# Patient Record
Sex: FEMALE | Race: WHITE
[De-identification: names, ages, dates, MRNs, and addresses within clinical notes are randomized per-mention and may not be internally consistent; named-entity substitution may affect disease eponyms.]

---

## 2019-01-27 ENCOUNTER — HOSPITAL ENCOUNTER (INPATIENT)
Dept: HOSPITAL 62 - ER | Age: 54
LOS: 10 days | Discharge: HOME | DRG: 189 | End: 2019-02-06
Attending: FAMILY MEDICINE | Admitting: FAMILY MEDICINE
Payer: MEDICARE

## 2019-01-27 DIAGNOSIS — G47.33: ICD-10-CM

## 2019-01-27 DIAGNOSIS — J44.0: ICD-10-CM

## 2019-01-27 DIAGNOSIS — F32.9: ICD-10-CM

## 2019-01-27 DIAGNOSIS — F17.200: ICD-10-CM

## 2019-01-27 DIAGNOSIS — F41.1: ICD-10-CM

## 2019-01-27 DIAGNOSIS — E66.01: ICD-10-CM

## 2019-01-27 DIAGNOSIS — J96.02: Primary | ICD-10-CM

## 2019-01-27 DIAGNOSIS — J44.1: ICD-10-CM

## 2019-01-27 DIAGNOSIS — J18.9: ICD-10-CM

## 2019-01-27 LAB
A TYPE INFLUENZA AG: NEGATIVE
ADD MANUAL DIFF: NO
ALBUMIN SERPL-MCNC: 4.3 G/DL (ref 3.5–5)
ALP SERPL-CCNC: 84 U/L (ref 38–126)
ALT SERPL-CCNC: 40 U/L (ref 9–52)
ANION GAP SERPL CALC-SCNC: 6 MMOL/L (ref 5–19)
AST SERPL-CCNC: 21 U/L (ref 14–36)
B INFLUENZA AG: NEGATIVE
BASE EXCESS BLDV CALC-SCNC: 4 MMOL/L
BASOPHILS # BLD AUTO: 0.1 10^3/UL (ref 0–0.2)
BASOPHILS NFR BLD AUTO: 0.6 % (ref 0–2)
BILIRUB DIRECT SERPL-MCNC: 0.2 MG/DL (ref 0–0.4)
BILIRUB SERPL-MCNC: 0.5 MG/DL (ref 0.2–1.3)
BUN SERPL-MCNC: 15 MG/DL (ref 7–20)
CALCIUM: 8.5 MG/DL (ref 8.4–10.2)
CHLORIDE SERPL-SCNC: 99 MMOL/L (ref 98–107)
CO2 SERPL-SCNC: 33 MMOL/L (ref 22–30)
EOSINOPHIL # BLD AUTO: 0 10^3/UL (ref 0–0.6)
EOSINOPHIL NFR BLD AUTO: 0 % (ref 0–6)
ERYTHROCYTE [DISTWIDTH] IN BLOOD BY AUTOMATED COUNT: 14.6 % (ref 11.5–14)
GLUCOSE SERPL-MCNC: 145 MG/DL (ref 75–110)
HCO3 BLDV-SCNC: 32.4 MMOL/L (ref 20–32)
HCT VFR BLD CALC: 43.8 % (ref 36–47)
HGB BLD-MCNC: 14.7 G/DL (ref 12–15.5)
LYMPHOCYTES # BLD AUTO: 3.9 10^3/UL (ref 0.5–4.7)
LYMPHOCYTES NFR BLD AUTO: 34.6 % (ref 13–45)
MCH RBC QN AUTO: 32.1 PG (ref 27–33.4)
MCHC RBC AUTO-ENTMCNC: 33.5 G/DL (ref 32–36)
MCV RBC AUTO: 96 FL (ref 80–97)
MONOCYTES # BLD AUTO: 1.1 10^3/UL (ref 0.1–1.4)
MONOCYTES NFR BLD AUTO: 10.1 % (ref 3–13)
NEUTROPHILS # BLD AUTO: 6.1 10^3/UL (ref 1.7–8.2)
NEUTS SEG NFR BLD AUTO: 54.7 % (ref 42–78)
PCO2 BLDV: 65.1 MMHG (ref 35–63)
PH BLDV: 7.32 [PH] (ref 7.3–7.42)
PLATELET # BLD: 156 10^3/UL (ref 150–450)
POTASSIUM SERPL-SCNC: 4.7 MMOL/L (ref 3.6–5)
PROT SERPL-MCNC: 7 G/DL (ref 6.3–8.2)
RBC # BLD AUTO: 4.58 10^6/UL (ref 3.72–5.28)
SODIUM SERPL-SCNC: 137.6 MMOL/L (ref 137–145)
TOTAL CELLS COUNTED % (AUTO): 100 %
WBC # BLD AUTO: 11.2 10^3/UL (ref 4–10.5)

## 2019-01-27 PROCEDURE — 83605 ASSAY OF LACTIC ACID: CPT

## 2019-01-27 PROCEDURE — 83735 ASSAY OF MAGNESIUM: CPT

## 2019-01-27 PROCEDURE — 84484 ASSAY OF TROPONIN QUANT: CPT

## 2019-01-27 PROCEDURE — 71046 X-RAY EXAM CHEST 2 VIEWS: CPT

## 2019-01-27 PROCEDURE — 80048 BASIC METABOLIC PNL TOTAL CA: CPT

## 2019-01-27 PROCEDURE — 82553 CREATINE MB FRACTION: CPT

## 2019-01-27 PROCEDURE — 36600 WITHDRAWAL OF ARTERIAL BLOOD: CPT

## 2019-01-27 PROCEDURE — 71045 X-RAY EXAM CHEST 1 VIEW: CPT

## 2019-01-27 PROCEDURE — 5A09357 ASSISTANCE WITH RESPIRATORY VENTILATION, LESS THAN 24 CONSECUTIVE HOURS, CONTINUOUS POSITIVE AIRWAY PRESSURE: ICD-10-PCS | Performed by: FAMILY MEDICINE

## 2019-01-27 PROCEDURE — 82550 ASSAY OF CK (CPK): CPT

## 2019-01-27 PROCEDURE — 94660 CPAP INITIATION&MGMT: CPT

## 2019-01-27 PROCEDURE — 93306 TTE W/DOPPLER COMPLETE: CPT

## 2019-01-27 PROCEDURE — 87040 BLOOD CULTURE FOR BACTERIA: CPT

## 2019-01-27 PROCEDURE — 85027 COMPLETE CBC AUTOMATED: CPT

## 2019-01-27 PROCEDURE — 93005 ELECTROCARDIOGRAM TRACING: CPT

## 2019-01-27 PROCEDURE — 36415 COLL VENOUS BLD VENIPUNCTURE: CPT

## 2019-01-27 PROCEDURE — 93010 ELECTROCARDIOGRAM REPORT: CPT

## 2019-01-27 PROCEDURE — 87205 SMEAR GRAM STAIN: CPT

## 2019-01-27 PROCEDURE — 87804 INFLUENZA ASSAY W/OPTIC: CPT

## 2019-01-27 PROCEDURE — 84132 ASSAY OF SERUM POTASSIUM: CPT

## 2019-01-27 PROCEDURE — 82803 BLOOD GASES ANY COMBINATION: CPT

## 2019-01-27 PROCEDURE — 85025 COMPLETE CBC W/AUTO DIFF WBC: CPT

## 2019-01-27 PROCEDURE — 71250 CT THORAX DX C-: CPT

## 2019-01-27 PROCEDURE — 80053 COMPREHEN METABOLIC PANEL: CPT

## 2019-01-27 RX ADMIN — METHYLPREDNISOLONE SODIUM SUCCINATE SCH MG: 40 INJECTION, POWDER, FOR SOLUTION INTRAMUSCULAR; INTRAVENOUS at 16:45

## 2019-01-27 RX ADMIN — ROPINIROLE HYDROCHLORIDE SCH MG: 2 TABLET, FILM COATED ORAL at 22:29

## 2019-01-27 RX ADMIN — MAGNESIUM SULFATE IN DEXTROSE SCH MLS/HR: 10 INJECTION, SOLUTION INTRAVENOUS at 06:09

## 2019-01-27 RX ADMIN — IPRATROPIUM BROMIDE AND ALBUTEROL SULFATE SCH ML: 2.5; .5 SOLUTION RESPIRATORY (INHALATION) at 19:32

## 2019-01-27 RX ADMIN — MAGNESIUM SULFATE IN DEXTROSE SCH MLS/HR: 10 INJECTION, SOLUTION INTRAVENOUS at 06:08

## 2019-01-27 RX ADMIN — PREGABALIN SCH MG: 100 CAPSULE ORAL at 22:17

## 2019-01-27 RX ADMIN — IPRATROPIUM BROMIDE AND ALBUTEROL SULFATE SCH: 2.5; .5 SOLUTION RESPIRATORY (INHALATION) at 11:43

## 2019-01-27 RX ADMIN — CLONAZEPAM SCH MG: 1 TABLET ORAL at 22:17

## 2019-01-27 RX ADMIN — CEFTRIAXONE SCH MLS/HR: 1 INJECTION, SOLUTION INTRAVENOUS at 11:07

## 2019-01-27 RX ADMIN — ACETAMINOPHEN PRN MG: 325 TABLET ORAL at 18:59

## 2019-01-27 RX ADMIN — IPRATROPIUM BROMIDE AND ALBUTEROL SULFATE SCH ML: 2.5; .5 SOLUTION RESPIRATORY (INHALATION) at 16:30

## 2019-01-27 RX ADMIN — METHYLPREDNISOLONE SODIUM SUCCINATE SCH MG: 40 INJECTION, POWDER, FOR SOLUTION INTRAMUSCULAR; INTRAVENOUS at 11:07

## 2019-01-27 RX ADMIN — METHYLPREDNISOLONE SODIUM SUCCINATE SCH: 40 INJECTION, POWDER, FOR SOLUTION INTRAMUSCULAR; INTRAVENOUS at 17:28

## 2019-01-27 RX ADMIN — ENOXAPARIN SODIUM SCH MG: 40 INJECTION SUBCUTANEOUS at 11:23

## 2019-01-27 RX ADMIN — FLUTICASONE PROPIONATE AND SALMETEROL SCH INHALER: 50; 250 POWDER RESPIRATORY (INHALATION) at 22:29

## 2019-01-27 RX ADMIN — IPRATROPIUM BROMIDE AND ALBUTEROL SULFATE SCH ML: 2.5; .5 SOLUTION RESPIRATORY (INHALATION) at 23:48

## 2019-01-27 NOTE — EKG REPORT
SEVERITY:- BORDERLINE ECG -

SINUS TACHYCARDIA

PROBABLE LEFT ATRIAL ABNORMALITY

BORDERLINE LEFT AXIS DEVIATION

:

Confirmed by: Tabatha Silvestre MD 27-Jan-2019 07:52:50

## 2019-01-27 NOTE — ER DOCUMENT REPORT
ED General





- General


Chief Complaint: Breathing Difficulty


Stated Complaint: COUGH,HEADACHE


Time Seen by Provider: 01/27/19 05:52


TRAVEL OUTSIDE OF THE [place] IN LAST 30 DAYS: No





- HPI


Patient complains to provider of: Difficulty breathing


Notes: 





Patient coming in today for evaluation of shortness of breath.  Patient has a 

history of COPD and continues to smoke.  Patient was seen by triage provider his

notes provided below





53-year-old female with chief complaint of respiratory distress, worsening 

difficulty breathing, comes by EMS, febrile at home, temperature 100.8 on arriv

al, patient took 1000 mg of Tylenol just prior to arrival reportedly.  Given 3 

DuoNeb treatments already, 125 mg of Solu-Medrol already.  Patient smokes, has 

history of COPD, has had influenza vaccine.





Patient upon my evaluation currently on BiPAP states she is feeling better 

breathing easier however not 100%.  Patient denies any recent antibiotics.  

Patient denies any chest pain abdominal pain nausea vomiting.





- Related Data


Allergies/Adverse Reactions: 


                                        





No Known Allergies Allergy (Unverified 01/27/19 07:19)


   











Past Medical History





- Social History


Smoking Status: Former Smoker


Family History: Reviewed & Not Pertinent


Patient has suicidal ideation: No


Patient has homicidal ideation: No


Pulmonary Medical History: Reports: Hx Asthma, Hx COPD


Renal/ Medical History: Denies: Hx Peritoneal Dialysis





Review of Systems





- Review of Systems


Constitutional: No symptoms reported


EENT: No symptoms reported


Cardiovascular: No symptoms reported


Respiratory: Short of breath, Wheezing


Gastrointestinal: No symptoms reported


Genitourinary: No symptoms reported


Female Genitourinary: No symptoms reported


Musculoskeletal: No symptoms reported


Skin: No symptoms reported


Hematologic/Lymphatic: No symptoms reported


Neurological/Psychological: No symptoms reported


-: Yes All other systems reviewed and negative





Physical Exam





- Vital signs


Vitals: 


                                        











Resp BP Pulse Ox


 


 23 H  132/81 H  98 


 


 01/27/19 05:49  01/27/19 05:49  01/27/19 05:49











Interpretation: Normal





- General


General appearance: Appears well, Alert





- HEENT


Head: Normocephalic, Atraumatic


Eyes: Normal


Pupils: PERRL





- Respiratory


Respiratory status: No respiratory distress


Chest status: Nontender


Breath sounds: Rhonchi, Wheezing


Chest palpation: Normal





- Cardiovascular


Rhythm: Regular


Heart sounds: Normal auscultation


Murmur: No





- Abdominal


Inspection: Normal


Distension: No distension


Bowel sounds: Normal


Tenderness: Nontender


Organomegaly: No organomegaly





- Back


Back: Normal, Nontender





- Extremities


General upper extremity: Normal inspection, Nontender, Normal color, Normal ROM,

Normal temperature


General lower extremity: Normal inspection, Nontender, Normal color, Normal ROM,

Normal temperature, Normal weight bearing.  No: Yessica's sign





- Neurological


Neuro grossly intact: Yes


Cognition: Normal


Orientation: AAOx4


Jber Coma Scale Eye Opening: Spontaneous


Sandra Coma Scale Verbal: Oriented


Sandra Coma Scale Motor: Obeys Commands


Jber Coma Scale Total: 15


Speech: Normal


Motor strength normal: LUE, RUE, LLE, RLE


Sensory: Normal





- Psychological


Associated symptoms: Normal affect, Normal mood





- Skin


Skin Temperature: Warm


Skin Moisture: Dry


Skin Color: Normal





Course





- Re-evaluation


Re-evalutation: 





01/27/19 13:20


Continue to manage BiPAP during patient stay here.  Laboratory studies show 

hypercapnia chest x-ray was read as possible developing pneumonia.  Patient was 

started on antibiotics.  We will discussed patient's case with the hospitalist 

will admit the patient for further evaluation and treatment





- Vital Signs


Vital signs: 


                                        











Temp Pulse Resp BP Pulse Ox


 


 98.3 F      15   120/66   93 


 


 01/27/19 05:54     01/27/19 12:01  01/27/19 12:01  01/27/19 12:01














- Laboratory


Result Diagrams: 


                                 01/27/19 06:05





                                 01/27/19 06:05


Laboratory results interpreted by me: 


                                        











  01/27/19 01/27/19 01/27/19





  06:05 06:05 06:05


 


WBC  11.2 H  


 


RDW  14.6 H  


 


VBG pCO2    65.1 H*


 


VBG HCO3    32.4 H


 


Carbon Dioxide   33 H 


 


Glucose   145 H 














Discharge





- Discharge


Clinical Impression: 


 COPD exacerbation, Respiratory distress with hypercapnia





Pneumonia


Qualifiers:


 Pneumonia type: due to unspecified organism Laterality: unspecified laterality 

Lung location: unspecified part of lung Qualified Code(s): J18.9 - Pneumonia, 

unspecified organism





Condition: Good


Disposition: ADMITTED AS INPATIENT


Admitting Provider: Hospitalist [facility]


Unit Admitted: [facility]

## 2019-01-27 NOTE — PDOC H&P
History of Present Illness


Admission Date/PCP: 


  01/27/19 10:12





  





Patient complains of: SOB


History of Present Illness: 


DANIELA BLANKENSHIP is a 53 year old female with h/o COPD who is active smoker. she 

presents with acute onset SOB started 3 days ago associated with fever (max 

102), cough and wheezing. CXR in ER + pneumonia. she was hypercapnic and was 

started on Bipap. 








Past Medical History


Pulmonary Medical History: Reports: Asthma, Chronic Obstructive Pulmonary 

Disease (COPD)





Social History


Smoking Status: Current Every Day Smoker





Family History


Family History: Reviewed & Not Pertinent


Parental Family History Reviewed: Yes


Children Family History Reviewed: Yes


Sibling(s) Family History Reviewed.: Yes





Medication/Allergy


Home Medications: 








Albuterol Sulfate [Proair Hfa Inhalation Aerosol 8.5 gm Mdi] 2 puff IH Q6HP PRN 

01/27/19 


Clonazepam [Klonopin] 0.5 mg PO QHS 01/27/19 


Flu Vacc [id number](6Mos Up)/Pf [Fluarix Quad 7667-4765 Syringe] 0.5 ml IM 

.RECEIVED 9/27/18 MDD 9/27/18 01/27/19 


Fluticasone/Salmeterol [Advair 250-50 Diskus 14 Dose/Diskus] 1 inh IH Q12 

01/27/19 


Pregabalin [Lyrica 100 Mg Capsule] 200 mg PO Q8 01/27/19 


Ropinirole HCl [Requip 2 Mg Tablet] 2 mg PO Q12 01/27/19 


Tiotropium Bromide [Spiriva Respimat] 2 puff IH DAILY 01/27/19 








Allergies/Adverse Reactions: 


                                        





No Known Allergies Allergy (Unverified 01/27/19 07:19)


   











Physical Exam


Vital Signs: 


                                        











Temp Pulse Resp BP Pulse Ox


 


 98.3 F      15   120/66   93 


 


 01/27/19 05:54     01/27/19 12:01  01/27/19 12:01  01/27/19 12:01








                                 Intake & Output











 01/26/19 01/27/19 01/28/19





 06:59 06:59 06:59


 


Intake Total  2 150


 


Balance  2 150


 


Weight  209 lb 10.554 oz 











General appearance: PRESENT: cooperative, mild distress, obese


Head exam: PRESENT: atraumatic, normocephalic


Eye exam: PRESENT: EOMI.  ABSENT: conjunctival injection, nystagmus


Ear exam: ABSENT: bleeding, drainage


Mouth exam: PRESENT: moist, neck supple


Neck exam: ABSENT: meningismus, tenderness


Respiratory exam: PRESENT: prolonged expiratory phas, wheezes.  ABSENT: 

accessory muscle use


Cardiovascular exam: PRESENT: RRR.  ABSENT: systolic murmur


Pulses: PRESENT: normal radial pulses, normal dorsalis pedis pul


GI/Abdominal exam: PRESENT: normal bowel sounds, soft.  ABSENT: ascites, mass, 

tenderness


Rectal exam: PRESENT: deferred


Extremities exam: ABSENT: calf tenderness, joint swelling, pedal edema


Musculoskeletal exam: PRESENT: normal inspection.  ABSENT: deformity


Neurological exam: PRESENT: alert, awake, oriented to person, oriented to place,

oriented to time, oriented to situation


Psychiatric exam: PRESENT: appropriate affect, flat affect.  ABSENT: agitated, 

anxious





Results


Laboratory Results: 


                                        





                                 01/27/19 06:05 





                                 01/27/19 06:05 





                                        











  01/27/19 01/27/19 01/27/19





  06:05 06:05 06:05


 


WBC  11.2 H  


 


RBC  4.58  


 


Hgb  14.7  


 


Hct  43.8  


 


MCV  96  


 


MCH  32.1  


 


MCHC  33.5  


 


RDW  14.6 H  


 


Plt Count  156  


 


Seg Neutrophils %  54.7  


 


Lymphocytes %  34.6  


 


Monocytes %  10.1  


 


Eosinophils %  0.0  


 


Basophils %  0.6  


 


Absolute Neutrophils  6.1  


 


Absolute Lymphocytes  3.9  


 


Absolute Monocytes  1.1  


 


Absolute Eosinophils  0.0  


 


Absolute Basophils  0.1  


 


VBG pH   


 


VBG pCO2   


 


VBG HCO3   


 


VBG Base Excess   


 


Sodium   137.6 


 


Potassium   4.7 


 


Chloride   99 


 


Carbon Dioxide   33 H 


 


Anion Gap   6 


 


BUN   15 


 


Creatinine   0.54 


 


Est GFR ( Amer)   > 60 


 


Est GFR (Non-Af Amer)   > 60 


 


Glucose   145 H 


 


Lactic Acid    0.8


 


Calcium   8.5 


 


Magnesium   


 


Total Bilirubin   0.5 


 


AST   21 


 


ALT   40 


 


Alkaline Phosphatase   84 


 


Total Protein   7.0 


 


Albumin   4.3 














  01/27/19 01/27/19





  06:05 06:05


 


WBC  


 


RBC  


 


Hgb  


 


Hct  


 


MCV  


 


MCH  


 


MCHC  


 


RDW  


 


Plt Count  


 


Seg Neutrophils %  


 


Lymphocytes %  


 


Monocytes %  


 


Eosinophils %  


 


Basophils %  


 


Absolute Neutrophils  


 


Absolute Lymphocytes  


 


Absolute Monocytes  


 


Absolute Eosinophils  


 


Absolute Basophils  


 


VBG pH  7.32 


 


VBG pCO2  65.1 H* 


 


VBG HCO3  32.4 H 


 


VBG Base Excess  4.0 


 


Sodium  


 


Potassium  


 


Chloride  


 


Carbon Dioxide  


 


Anion Gap  


 


BUN  


 


Creatinine  


 


Est GFR ( Amer)  


 


Est GFR (Non-Af Amer)  


 


Glucose  


 


Lactic Acid  


 


Calcium  


 


Magnesium   1.9


 


Total Bilirubin  


 


AST  


 


ALT  


 


Alkaline Phosphatase  


 


Total Protein  


 


Albumin  








                                        





01/27/19 10:40   Sputum   Gram Stain - Final


01/27/19 10:40   Sputum   Sputum Culture - Final





                                        











  01/27/19





  06:05


 


Troponin I  < 0.012











Impressions: 


                                        





Chest X-Ray  01/27/19 05:54


IMPRESSION:


 


1. Hazy left basilar opacity may represent developing lingular


pneumonia or atelectasis.


 


 














Assessment & Plan





- Diagnosis


(1) Acute respiratory failure with hypercapnia


Is this a current diagnosis for this admission?: Yes   


Plan: 


continue BiPAP and titrate as tolerated








(2) COPD exacerbation


Is this a current diagnosis for this admission?: Yes   


Plan: 


start IV solumedrol, aggressive pulmonary toilet, bronchodilators 








(3) Pneumonia


Qualifiers: 


   Pneumonia type: due to unspecified organism   Laterality: unspecified lateral

ity   Lung location: unspecified part of lung   Qualified Code(s): J18.9 - 

Pneumonia, unspecified organism   


Is this a current diagnosis for this admission?: Yes   


Plan: 


start empiric ceftriaxone and azithromycin

## 2019-01-27 NOTE — RADIOLOGY REPORT (SQ)
EXAM DESCRIPTION:  CT CHEST WITHOUT



COMPLETED DATE/TIME:  1/27/2019 3:59 pm



REASON FOR STUDY:  pneumonia



COMPARISON:  Chest x-ray 1/27/2019.



TECHNIQUE:  CT scan performed of the chest without intravenous contrast.  Images reviewed with lung, 
soft tissue and bone windows.  Reconstructed coronal and sagittal MPR images reviewed.  All images st
ored on PACS.

All CT scanners at this facility use dose modulation, iterative reconstruction, and/or weight based d
osing when appropriate to reduce radiation dose to as low as reasonably achievable (ALARA).

[facility]: Dose Right  [facility]: CareDose    [facility]: Dose Right    [facility]: Teradose 4D    [facility]: Smart Appknox



RADIATION DOSE:  CT Rad equipment meets quality standard of care and radiation dose reduction techniq
ues were employed. CTDIvol: 18.8 mGy. DLP: 699 mGy-cm. mGy.



LIMITATIONS:  Motion artifact.



FINDINGS:  LUNGS AND PLEURA: There are bilateral emphysematous changes.  Tree-in-bud opacities are no
brook at the bilateral upper lobes.  No consolidation, pleural effusion or pneumothorax.

HILAR AND MEDIASTINAL STRUCTURES: No identified masses or abnormal nodes.

HEART AND VASCULAR STRUCTURES: There is 4.2 x 4.2 cm aneurysmal dilation of the ascending thoracic ao
rta.  Scatter atherosclerotic calcifications at the thoracic aorta.  No pericardial effusion.  Corona
ry arteries calcifications are noted.

UPPER ABDOMEN: No significant findings.  Limited exam.

BONES: Mild degenerative changes at the spine.

HARDWARE: None in the chest.



IMPRESSION:  1. Emphysema.  Tree-in-bud opacities at the bilateral upper lobes, suggestive of acute i
nfection/ inflammation of the smaller airways such as bronchiolitis or bronchopneumonia.

2. 4.2 x 4.2 cm ascending thoracic aortic aneurysm.



TECHNICAL DOCUMENTATION:  JOB ID:  [id number]

[facility]

Quality ID # 436: Final reports with documentation of one or more dose reduction techniques (e.g., Au
tomated exposure control, adjustment of the mA and/or kV according to patient size, use of iterative 
reconstruction technique)

 2011 [employer]- All Rights Reserved



Reading location - IP/workstation name: [email or web address]

## 2019-01-27 NOTE — RADIOLOGY REPORT (SQ)
EXAM DESCRIPTION: 



XR CHEST 1 VIEW



COMPLETED DATE/TME:  01/27/2019 05:54



CLINICAL HISTORY: fever, shortness of breath



COMPARISON: None.



FINDINGS: Single frontal view of the chest. Leads overlie the

chest. The cardiomediastinal silhouette has normal size and

contour. Hazy left basilar opacity. No pneumothorax or large

effusion.  No displaced rib fractures identified.  Upper

abdominal soft tissues are unremarkable.



IMPRESSION:



1. Hazy left basilar opacity may represent developing lingular

pneumonia or atelectasis.

## 2019-01-27 NOTE — ER DOCUMENT REPORT
ED Medical Screen (RME)





- General


Stated Complaint: COUGH,HEADACHE


Time Seen by Provider: 01/27/19 05:52


Notes: 





53-year-old female with chief complaint of respiratory distress, worsening 

difficulty breathing, comes by EMS, febrile at home, temperature 100.8 on 

arrival, patient took 1000 mg of Tylenol just prior to arrival reportedly.  

Given 3 DuoNeb treatments already, 125 mg of Solu-Medrol already.  Patient 

smokes, has history of COPD, has had influenza vaccine.





Physical Exam





- Respiratory


Respiratory status: Respiratory distress, Labored, Tachypnea


Breath sounds: Decreased air movement, Rhonchi, Wheezing

## 2019-01-28 LAB
ANION GAP SERPL CALC-SCNC: 7 MMOL/L (ref 5–19)
ARTERIAL BLOOD FIO2: (no result)
ARTERIAL BLOOD H2CO3: 1.94 MMOL/L (ref 1.05–1.35)
ARTERIAL BLOOD HCO3: 34.6 MMOL/L (ref 20–24)
ARTERIAL BLOOD PCO2: 64.4 MMHG (ref 35–45)
ARTERIAL BLOOD PH: 7.35 (ref 7.35–7.45)
ARTERIAL BLOOD PO2: 127.3 MMHG (ref 80–100)
ARTERIAL BLOOD TOTAL CO2: 36.6 MMOL/L (ref 21–25)
BASE EXCESS BLDA CALC-SCNC: 6.4 MMOL/L
BUN SERPL-MCNC: 15 MG/DL (ref 7–20)
CALCIUM: 9.2 MG/DL (ref 8.4–10.2)
CHLORIDE SERPL-SCNC: 99 MMOL/L (ref 98–107)
CO2 SERPL-SCNC: 36 MMOL/L (ref 22–30)
ERYTHROCYTE [DISTWIDTH] IN BLOOD BY AUTOMATED COUNT: 14.3 % (ref 11.5–14)
GLUCOSE SERPL-MCNC: 163 MG/DL (ref 75–110)
HCT VFR BLD CALC: 44.7 % (ref 36–47)
HGB BLD-MCNC: 15 G/DL (ref 12–15.5)
MCH RBC QN AUTO: 32.1 PG (ref 27–33.4)
MCHC RBC AUTO-ENTMCNC: 33.6 G/DL (ref 32–36)
MCV RBC AUTO: 96 FL (ref 80–97)
PLATELET # BLD: 157 10^3/UL (ref 150–450)
POTASSIUM SERPL-SCNC: 4.9 MMOL/L (ref 3.6–5)
RBC # BLD AUTO: 4.68 10^6/UL (ref 3.72–5.28)
SAO2 % BLDA: 98.3 % (ref 94–98)
SODIUM SERPL-SCNC: 142.4 MMOL/L (ref 137–145)
WBC # BLD AUTO: 13.2 10^3/UL (ref 4–10.5)

## 2019-01-28 RX ADMIN — CEFTRIAXONE SCH MLS/HR: 1 INJECTION, SOLUTION INTRAVENOUS at 09:17

## 2019-01-28 RX ADMIN — ROPINIROLE HYDROCHLORIDE SCH MG: 2 TABLET, FILM COATED ORAL at 09:26

## 2019-01-28 RX ADMIN — ENOXAPARIN SODIUM SCH MG: 40 INJECTION SUBCUTANEOUS at 09:18

## 2019-01-28 RX ADMIN — PREGABALIN SCH MG: 100 CAPSULE ORAL at 21:53

## 2019-01-28 RX ADMIN — METHYLPREDNISOLONE SODIUM SUCCINATE SCH: 40 INJECTION, POWDER, FOR SOLUTION INTRAMUSCULAR; INTRAVENOUS at 06:03

## 2019-01-28 RX ADMIN — IPRATROPIUM BROMIDE AND ALBUTEROL SULFATE SCH ML: 2.5; .5 SOLUTION RESPIRATORY (INHALATION) at 20:29

## 2019-01-28 RX ADMIN — PREGABALIN SCH MG: 100 CAPSULE ORAL at 14:46

## 2019-01-28 RX ADMIN — IPRATROPIUM BROMIDE AND ALBUTEROL SULFATE SCH ML: 2.5; .5 SOLUTION RESPIRATORY (INHALATION) at 08:53

## 2019-01-28 RX ADMIN — FLUTICASONE PROPIONATE AND SALMETEROL SCH INHALER: 50; 250 POWDER RESPIRATORY (INHALATION) at 09:19

## 2019-01-28 RX ADMIN — PREGABALIN SCH MG: 100 CAPSULE ORAL at 06:00

## 2019-01-28 RX ADMIN — FLUTICASONE PROPIONATE AND SALMETEROL SCH INHALER: 50; 250 POWDER RESPIRATORY (INHALATION) at 21:54

## 2019-01-28 RX ADMIN — IPRATROPIUM BROMIDE AND ALBUTEROL SULFATE SCH ML: 2.5; .5 SOLUTION RESPIRATORY (INHALATION) at 12:29

## 2019-01-28 RX ADMIN — IPRATROPIUM BROMIDE AND ALBUTEROL SULFATE SCH ML: 2.5; .5 SOLUTION RESPIRATORY (INHALATION) at 16:40

## 2019-01-28 RX ADMIN — METHYLPREDNISOLONE SODIUM SUCCINATE SCH MG: 40 INJECTION, POWDER, FOR SOLUTION INTRAMUSCULAR; INTRAVENOUS at 03:24

## 2019-01-28 RX ADMIN — IPRATROPIUM BROMIDE AND ALBUTEROL SULFATE SCH ML: 2.5; .5 SOLUTION RESPIRATORY (INHALATION) at 04:34

## 2019-01-28 RX ADMIN — ROPINIROLE HYDROCHLORIDE SCH MG: 2 TABLET, FILM COATED ORAL at 21:53

## 2019-01-28 RX ADMIN — METHYLPREDNISOLONE SODIUM SUCCINATE SCH MG: 40 INJECTION, POWDER, FOR SOLUTION INTRAMUSCULAR; INTRAVENOUS at 18:58

## 2019-01-28 RX ADMIN — METHYLPREDNISOLONE SODIUM SUCCINATE SCH MG: 40 INJECTION, POWDER, FOR SOLUTION INTRAMUSCULAR; INTRAVENOUS at 12:25

## 2019-01-28 RX ADMIN — CLONAZEPAM SCH MG: 1 TABLET ORAL at 21:53

## 2019-01-28 RX ADMIN — AZITHROMYCIN SCH MG: 250 TABLET, FILM COATED ORAL at 09:18

## 2019-01-28 NOTE — PDOC PROGRESS REPORT
Subjective


Progress Note for:: 01/28/19


Subjective:: 





Patient was seen and examined today.  She has been on and off BiPAP.  ABG shows 

persistent hypercapnia.  Patient feels weak and still coughing and wheezing.  

She is still short of breath with exertion.


Reason For Visit: 


COPD EXACERBATION








Physical Exam


Vital Signs: 


                                        











Temp Pulse Resp BP Pulse Ox


 


 98.3 F   108 H  14   110/75   88 L


 


 01/28/19 06:05  01/28/19 08:54  01/28/19 11:00  01/28/19 10:01  01/28/19 11:00








                                 Intake & Output











 01/27/19 01/28/19 01/29/19





 06:59 06:59 06:59


 


Intake Total 2 150 290


 


Balance 2 150 290


 


Weight 209 lb 10.554 oz  











General appearance: PRESENT: mild distress, obese


Head exam: PRESENT: atraumatic, normocephalic


Mouth exam: PRESENT: neck supple.  ABSENT: moist


Neck exam: ABSENT: meningismus, tenderness


Respiratory exam: PRESENT: accessory muscle use, prolonged expiratory phas, 

wheezes


Cardiovascular exam: PRESENT: RRR.  ABSENT: systolic murmur


Pulses: PRESENT: normal radial pulses


GI/Abdominal exam: PRESENT: normal bowel sounds, soft.  ABSENT: mass, tenderness


Rectal exam: PRESENT: deferred


Extremities exam: ABSENT: pedal edema


Neurological exam: PRESENT: alert, awake, oriented to person, oriented to place,

oriented to time, oriented to situation





Results


Laboratory Results: 


                                        





                                 01/28/19 06:10 





                                 01/28/19 06:10 





                                        











  01/28/19 01/28/19 01/28/19





  06:10 06:10 06:31


 


WBC  13.2 H  


 


RBC  4.68  


 


Hgb  15.0  


 


Hct  44.7  


 


MCV  96  


 


MCH  32.1  


 


MCHC  33.6  


 


RDW  14.3 H  


 


Plt Count  157  


 


Carbonic Acid    1.94 H


 


HCO3/H2CO3 Ratio    17:1


 


ABG pH    7.35


 


ABG pCO2    64.4 H


 


ABG pO2    127.3 H


 


ABG HCO3    34.6 H


 


ABG O2 Saturation    98.3 H


 


ABG Base Excess    6.4


 


FiO2    40%


 


Sodium   142.4 


 


Potassium   4.9 


 


Chloride   99 


 


Carbon Dioxide   36 H 


 


Anion Gap   7 


 


BUN   15 


 


Creatinine   0.42 L 


 


Est GFR ( Amer)   > 60 


 


Est GFR (Non-Af Amer)   > 60 


 


Glucose   163 H 


 


Calcium   9.2 








                                        





01/27/19 10:40   Sputum   Gram Stain - Final


01/27/19 10:40   Sputum   Sputum Culture - Final





                                        











  01/27/19





  06:05


 


Troponin I  < 0.012











Impressions: 


                                        





Chest CT  01/27/19 00:00


IMPRESSION:  1. Emphysema.  Tree-in-bud opacities at the bilateral upper lobes, 

suggestive of acute infection/ inflammation of the smaller airways such as 

bronchiolitis or bronchopneumonia.


2. 4.2 x 4.2 cm ascending thoracic aortic aneurysm.


 








Chest X-Ray  01/27/19 05:54


IMPRESSION:


 


1. Hazy left basilar opacity may represent developing lingular


pneumonia or atelectasis.


 


 














Assessment & Plan





- Diagnosis


(1) Acute respiratory failure with hypercapnia


Is this a current diagnosis for this admission?: Yes   


Plan: 


continue BiPAP and titrate as tolerated.  Consult pulmonology.








(2) COPD exacerbation


Is this a current diagnosis for this admission?: Yes   


Plan: 


Continue IV solumedrol, aggressive pulmonary toilet, bronchodilators 








(3) Pneumonia


Qualifiers: 


   Pneumonia type: due to unspecified organism   Laterality: unspecified 

laterality   Lung location: unspecified part of lung   Qualified Code(s): J18.9 

- Pneumonia, unspecified organism   


Is this a current diagnosis for this admission?: Yes   


Plan: 


Continue empiric ceftriaxone and azithromycin.  Monitor blood culture.

## 2019-01-29 LAB
ANION GAP SERPL CALC-SCNC: 9 MMOL/L (ref 5–19)
ARTERIAL BLOOD H2CO3: 1.66 MMOL/L (ref 1.05–1.35)
ARTERIAL BLOOD HCO3: 30.7 MMOL/L (ref 20–24)
ARTERIAL BLOOD PCO2: 55.3 MMHG (ref 35–45)
ARTERIAL BLOOD PH: 7.36 (ref 7.35–7.45)
ARTERIAL BLOOD PO2: 76.2 MMHG (ref 80–100)
ARTERIAL BLOOD TOTAL CO2: 32.4 MMOL/L (ref 21–25)
BASE EXCESS BLDA CALC-SCNC: 3.8 MMOL/L
BUN SERPL-MCNC: 20 MG/DL (ref 7–20)
CALCIUM: 9 MG/DL (ref 8.4–10.2)
CHLORIDE SERPL-SCNC: 101 MMOL/L (ref 98–107)
CO2 SERPL-SCNC: 33 MMOL/L (ref 22–30)
ERYTHROCYTE [DISTWIDTH] IN BLOOD BY AUTOMATED COUNT: 14.5 % (ref 11.5–14)
GLUCOSE SERPL-MCNC: 233 MG/DL (ref 75–110)
HCT VFR BLD CALC: 42.1 % (ref 36–47)
HGB BLD-MCNC: 14.2 G/DL (ref 12–15.5)
MCH RBC QN AUTO: 32 PG (ref 27–33.4)
MCHC RBC AUTO-ENTMCNC: 33.8 G/DL (ref 32–36)
MCV RBC AUTO: 95 FL (ref 80–97)
PLATELET # BLD: 150 10^3/UL (ref 150–450)
POTASSIUM SERPL-SCNC: 4.6 MMOL/L (ref 3.6–5)
RBC # BLD AUTO: 4.45 10^6/UL (ref 3.72–5.28)
SAO2 % BLDA: 94.6 % (ref 94–98)
SODIUM SERPL-SCNC: 143 MMOL/L (ref 137–145)
WBC # BLD AUTO: 14.2 10^3/UL (ref 4–10.5)

## 2019-01-29 RX ADMIN — IPRATROPIUM BROMIDE AND ALBUTEROL SULFATE SCH ML: 2.5; .5 SOLUTION RESPIRATORY (INHALATION) at 21:23

## 2019-01-29 RX ADMIN — ACETAMINOPHEN PRN MG: 325 TABLET ORAL at 03:55

## 2019-01-29 RX ADMIN — CEFTRIAXONE SCH MLS/HR: 1 INJECTION, SOLUTION INTRAVENOUS at 09:07

## 2019-01-29 RX ADMIN — METHYLPREDNISOLONE SODIUM SUCCINATE SCH MG: 40 INJECTION, POWDER, FOR SOLUTION INTRAMUSCULAR; INTRAVENOUS at 12:27

## 2019-01-29 RX ADMIN — FLUTICASONE PROPIONATE AND SALMETEROL SCH INHALER: 50; 250 POWDER RESPIRATORY (INHALATION) at 09:08

## 2019-01-29 RX ADMIN — IPRATROPIUM BROMIDE AND ALBUTEROL SULFATE SCH ML: 2.5; .5 SOLUTION RESPIRATORY (INHALATION) at 03:37

## 2019-01-29 RX ADMIN — FLUTICASONE PROPIONATE AND SALMETEROL SCH INHALER: 50; 250 POWDER RESPIRATORY (INHALATION) at 22:12

## 2019-01-29 RX ADMIN — IPRATROPIUM BROMIDE AND ALBUTEROL SULFATE SCH ML: 2.5; .5 SOLUTION RESPIRATORY (INHALATION) at 16:38

## 2019-01-29 RX ADMIN — CLONAZEPAM SCH MG: 1 TABLET ORAL at 22:09

## 2019-01-29 RX ADMIN — ROPINIROLE HYDROCHLORIDE SCH MG: 2 TABLET, FILM COATED ORAL at 22:11

## 2019-01-29 RX ADMIN — IPRATROPIUM BROMIDE AND ALBUTEROL SULFATE SCH ML: 2.5; .5 SOLUTION RESPIRATORY (INHALATION) at 08:05

## 2019-01-29 RX ADMIN — ROPINIROLE HYDROCHLORIDE SCH MG: 2 TABLET, FILM COATED ORAL at 09:08

## 2019-01-29 RX ADMIN — METHYLPREDNISOLONE SODIUM SUCCINATE SCH MG: 40 INJECTION, POWDER, FOR SOLUTION INTRAMUSCULAR; INTRAVENOUS at 17:32

## 2019-01-29 RX ADMIN — METHYLPREDNISOLONE SODIUM SUCCINATE SCH MG: 40 INJECTION, POWDER, FOR SOLUTION INTRAMUSCULAR; INTRAVENOUS at 01:09

## 2019-01-29 RX ADMIN — PREGABALIN SCH MG: 100 CAPSULE ORAL at 13:34

## 2019-01-29 RX ADMIN — AZITHROMYCIN SCH MG: 250 TABLET, FILM COATED ORAL at 09:07

## 2019-01-29 RX ADMIN — PREGABALIN SCH MG: 100 CAPSULE ORAL at 05:31

## 2019-01-29 RX ADMIN — METHYLPREDNISOLONE SODIUM SUCCINATE SCH MG: 40 INJECTION, POWDER, FOR SOLUTION INTRAMUSCULAR; INTRAVENOUS at 22:10

## 2019-01-29 RX ADMIN — METHYLPREDNISOLONE SODIUM SUCCINATE SCH MG: 40 INJECTION, POWDER, FOR SOLUTION INTRAMUSCULAR; INTRAVENOUS at 05:31

## 2019-01-29 RX ADMIN — TIOTROPIUM BROMIDE SCH CAP: 18 CAPSULE ORAL; RESPIRATORY (INHALATION) at 22:11

## 2019-01-29 RX ADMIN — IPRATROPIUM BROMIDE AND ALBUTEROL SULFATE SCH ML: 2.5; .5 SOLUTION RESPIRATORY (INHALATION) at 13:04

## 2019-01-29 RX ADMIN — ACETAMINOPHEN PRN MG: 325 TABLET ORAL at 13:33

## 2019-01-29 RX ADMIN — PREGABALIN SCH MG: 100 CAPSULE ORAL at 22:10

## 2019-01-29 RX ADMIN — ENOXAPARIN SODIUM SCH MG: 40 INJECTION SUBCUTANEOUS at 09:08

## 2019-01-29 RX ADMIN — IPRATROPIUM BROMIDE AND ALBUTEROL SULFATE SCH ML: 2.5; .5 SOLUTION RESPIRATORY (INHALATION) at 00:34

## 2019-01-29 NOTE — RADIOLOGY REPORT (SQ)
EXAM DESCRIPTION:  CHEST SINGLE VIEW



COMPLETED DATE/TIME:  1/29/2019 10:45 am



REASON FOR STUDY:  pneumonia



COMPARISON:  1/29/2019



EXAM PARAMETERS:  NUMBER OF VIEWS: One view.

TECHNIQUE: Single frontal radiographic view of the chest acquired.

RADIATION DOSE: NA

LIMITATIONS: None.



FINDINGS:  LUNGS AND PLEURA: Improved but persistent minimal lingular opacities.  No significant effu
lisa.  No pneumothorax.

MEDIASTINUM AND HILAR STRUCTURES: No masses.  Contour normal.

HEART AND VASCULAR STRUCTURES: Normal heart size.  Atherosclerotic aorta.

BONES: No acute findings.

HARDWARE: None in the chest.

OTHER: No other significant finding.



IMPRESSION:  Improved but persistent hazy left lingular opacities.



TECHNICAL DOCUMENTATION:  JOB ID:  [id number]

 2011 [employer]- All Rights Reserved



Reading location - IP/workstation name: [email or web address]

## 2019-01-30 LAB
ANION GAP SERPL CALC-SCNC: 10 MMOL/L (ref 5–19)
ARTERIAL BLOOD FIO2: (no result)
ARTERIAL BLOOD H2CO3: 1.78 MMOL/L (ref 1.05–1.35)
ARTERIAL BLOOD HCO3: 32.2 MMOL/L (ref 20–24)
ARTERIAL BLOOD PCO2: 59.3 MMHG (ref 35–45)
ARTERIAL BLOOD PH: 7.35 (ref 7.35–7.45)
ARTERIAL BLOOD PO2: 83.8 MMHG (ref 80–100)
ARTERIAL BLOOD TOTAL CO2: 34 MMOL/L (ref 21–25)
BASE EXCESS BLDA CALC-SCNC: 4.8 MMOL/L
BUN SERPL-MCNC: 19 MG/DL (ref 7–20)
CALCIUM: 9 MG/DL (ref 8.4–10.2)
CHLORIDE SERPL-SCNC: 101 MMOL/L (ref 98–107)
CO2 SERPL-SCNC: 31 MMOL/L (ref 22–30)
ERYTHROCYTE [DISTWIDTH] IN BLOOD BY AUTOMATED COUNT: 14.6 % (ref 11.5–14)
GLUCOSE SERPL-MCNC: 256 MG/DL (ref 75–110)
HCT VFR BLD CALC: 41.5 % (ref 36–47)
HGB BLD-MCNC: 13.9 G/DL (ref 12–15.5)
MCH RBC QN AUTO: 31.9 PG (ref 27–33.4)
MCHC RBC AUTO-ENTMCNC: 33.5 G/DL (ref 32–36)
MCV RBC AUTO: 95 FL (ref 80–97)
PLATELET # BLD: 139 10^3/UL (ref 150–450)
POTASSIUM SERPL-SCNC: 4.7 MMOL/L (ref 3.6–5)
RBC # BLD AUTO: 4.36 10^6/UL (ref 3.72–5.28)
SAO2 % BLDA: 95.6 % (ref 94–98)
SODIUM SERPL-SCNC: 141.7 MMOL/L (ref 137–145)
WBC # BLD AUTO: 10.6 10^3/UL (ref 4–10.5)

## 2019-01-30 RX ADMIN — PREGABALIN SCH MG: 100 CAPSULE ORAL at 21:36

## 2019-01-30 RX ADMIN — FLUTICASONE PROPIONATE AND SALMETEROL SCH INHALER: 50; 250 POWDER RESPIRATORY (INHALATION) at 10:56

## 2019-01-30 RX ADMIN — IPRATROPIUM BROMIDE AND ALBUTEROL SULFATE SCH ML: 2.5; .5 SOLUTION RESPIRATORY (INHALATION) at 04:08

## 2019-01-30 RX ADMIN — METHYLPREDNISOLONE SODIUM SUCCINATE SCH MG: 40 INJECTION, POWDER, FOR SOLUTION INTRAMUSCULAR; INTRAVENOUS at 09:54

## 2019-01-30 RX ADMIN — METHYLPREDNISOLONE SODIUM SUCCINATE SCH MG: 40 INJECTION, POWDER, FOR SOLUTION INTRAMUSCULAR; INTRAVENOUS at 21:35

## 2019-01-30 RX ADMIN — CEFTRIAXONE SCH MLS/HR: 1 INJECTION, SOLUTION INTRAVENOUS at 11:01

## 2019-01-30 RX ADMIN — ROPINIROLE HYDROCHLORIDE SCH MG: 2 TABLET, FILM COATED ORAL at 10:58

## 2019-01-30 RX ADMIN — IPRATROPIUM BROMIDE AND ALBUTEROL SULFATE SCH ML: 2.5; .5 SOLUTION RESPIRATORY (INHALATION) at 08:32

## 2019-01-30 RX ADMIN — ROPINIROLE HYDROCHLORIDE SCH MG: 2 TABLET, FILM COATED ORAL at 21:36

## 2019-01-30 RX ADMIN — METHYLPREDNISOLONE SODIUM SUCCINATE SCH MG: 40 INJECTION, POWDER, FOR SOLUTION INTRAMUSCULAR; INTRAVENOUS at 03:14

## 2019-01-30 RX ADMIN — ENOXAPARIN SODIUM SCH: 40 INJECTION SUBCUTANEOUS at 10:57

## 2019-01-30 RX ADMIN — PREGABALIN SCH MG: 100 CAPSULE ORAL at 14:05

## 2019-01-30 RX ADMIN — IPRATROPIUM BROMIDE AND ALBUTEROL SULFATE SCH ML: 2.5; .5 SOLUTION RESPIRATORY (INHALATION) at 11:44

## 2019-01-30 RX ADMIN — IPRATROPIUM BROMIDE AND ALBUTEROL SULFATE SCH ML: 2.5; .5 SOLUTION RESPIRATORY (INHALATION) at 00:12

## 2019-01-30 RX ADMIN — PREGABALIN SCH MG: 100 CAPSULE ORAL at 06:31

## 2019-01-30 RX ADMIN — METHYLPREDNISOLONE SODIUM SUCCINATE SCH MG: 40 INJECTION, POWDER, FOR SOLUTION INTRAMUSCULAR; INTRAVENOUS at 14:06

## 2019-01-30 RX ADMIN — TEMAZEPAM PRN MG: 7.5 CAPSULE ORAL at 04:30

## 2019-01-30 RX ADMIN — FLUTICASONE PROPIONATE AND SALMETEROL SCH INHALER: 50; 250 POWDER RESPIRATORY (INHALATION) at 21:37

## 2019-01-30 RX ADMIN — TIOTROPIUM BROMIDE SCH CAP: 18 CAPSULE ORAL; RESPIRATORY (INHALATION) at 10:59

## 2019-01-30 RX ADMIN — CLONAZEPAM SCH MG: 1 TABLET ORAL at 21:36

## 2019-01-30 RX ADMIN — IPRATROPIUM BROMIDE AND ALBUTEROL SULFATE SCH ML: 2.5; .5 SOLUTION RESPIRATORY (INHALATION) at 19:20

## 2019-01-30 RX ADMIN — IPRATROPIUM BROMIDE AND ALBUTEROL SULFATE SCH ML: 2.5; .5 SOLUTION RESPIRATORY (INHALATION) at 16:03

## 2019-01-30 RX ADMIN — AZITHROMYCIN SCH MG: 250 TABLET, FILM COATED ORAL at 10:58

## 2019-01-30 NOTE — CONSULTATION REPORT E
Consultation Report



NAME: DANIELA STEEL

MRN:  [id number]               : 1965      AGE: 53Y

DATE: 2019  309  A



TO:   DOUGLAS FOX M.D.



FROM: ALEXY IRVING M.D.

      Requesting Physician



HISTORY OF PRESENT ILLNESS:

The patient is a 54-year-old  female who came in with increasing

dyspnea, wheezing, chest tightness over the last 2 to 3 days.  She denies

labile coughing, yellow-green phlegm, denies any fevers or chills at home.

Condition worsened so patient went to the emergency room and was

subsequently admitted.  Patient was placed on BiPAP and given IV

solu-medrol and nebulizer treatment.   Clearing the cough makes him feel a

lot better.  She denies hemoptysis.  No vomiting or diarrhea.



PAST MEDICAL HISTORY:

1.   Asthma.

2.   COPD.



SOCIAL HISTORY:

History of smoking a pack a day for several years.



FAMILY HISTORY:

Reviewed and nothing pertinent.



HOME MEDICATIONS:

 1.   Albuterol ProAir inhaler 2 puffs every 6 hours as needed.

 2.   Clonopin 0.5 mg p.o. at bedtime for severe anxiety.

 3.   Advair 250/50 1 puff daily.

 4.   Lyrica 100 mg capsule.

 5.   Requip 2 mg p.o. q. 12.



ALLERGIES:

No drug allergies.



PHYSICAL EXAMINATION:

GENERAL:  The patient is a wake, alert, coherent, oriented x3.



VITAL SIGNS:  Temperature 97.7, T-max of 98 degrees Fahrenheit.  Heart

rate is 91, blood pressure is 143/65, respiratory rate of 18, saturation

is 95%.



HEENT:  Eyes; no jaundice or pallor.  Ears, nose, and throat; no ear

drainage and no nasal discharge.



CHEST AND LUNGS:  No wheezing, no rhonchi, no coarse crackles.



CARDIOVASCULAR:  S1, S2 distinct.  Normal rate, regular rhythm.



ABDOMEN:  Flabby.  Positive bowel sounds.  Soft, nondistended.



EXTREMITIES:  No joint swelling.  No cellulitis.



LABORATORY:

CBC done today showed a white count of 14.2, hemoglobin of 13.3 yesterday,

hemoglobin is 14.3, hematocrit 42.1, and platelet count 150.  ABGs done

today shows pH of 7.3, PCO2 55.3, PO2 76.2, and oxygen saturation 94.6%. 

Chemistry done today showed sodium is 143, potassium 4.6, chloride 101,

anion gap is 33, BUN 9, creatinine 2, glucose 233, and calcium 9.



Chest x-ray done today showed ,2019, showed some increase in

filtration in left lower lung.  Chest CT scan done on 2019

showed some findings suspicious for infectious process.  No

consolidation.  Chest x-ray done on 2019 shows

retained blood opacities and interstitial infiltrate involving the left

lingular area.



ASSESSMENT:

 1.   COPD/Bronchial asthma in active severe exacerbation.  Currently

     improving.  There is no wheezing or spasms in the posterior chest. 

     Some intermittent wheezing in the anterior chest left side.

 2.   Pneumonia, early involving the lingular segment based on today's x-ray

     and chest  CAT scan on 2019.



PLAN/RECOMMENDATIONS:

 1.   Continue Advair 250 1 puff daily.

 2.   Discontinue the Spiriva Respimat; not available.  Patient will use

     Spiriva inhaler 1 capsule daily.

 3.   Decrease solu-medrol dose to 40 mg q. 8 hours.

 4.   GI prophylaxis.

 5.   Patient is taking Clonopin for severe anxiety.

 6.   Continue antinue antibiotics.



 DICTATING PHYSICIAN: DOUGLAS FOX MD,RADHA,MPH





[id number]                  DT: 2019    [id number]

PHY#: [id number]            DD: 2019

ID:   [id number]           JOB#: [id number]      ACCT: [id number]



cc:DOUGLAS FOX M.D.

>







MTDD

## 2019-01-30 NOTE — PDOC PROGRESS REPORT
Subjective


Progress Note for:: 01/30/19


Subjective:: 





Patient was seen and examined today.  She has been on and off BiPAP.  ABG shows 

persistent hypercapnia but currently improving with PCO2 of 59.  She is using 

BiPAP during sleep for now.  She is still coughing and wheezing and far from her

baseline. 


Reason For Visit: 


COPD EXACERBATION








Physical Exam


Vital Signs: 


                                        











Temp Pulse Resp BP Pulse Ox


 


 97.7 F   96   22 H  148/78 H  96 


 


 01/30/19 15:32  01/30/19 16:03  01/30/19 16:03  01/30/19 15:32  01/30/19 16:03








                                 Intake & Output











 01/29/19 01/30/19 01/31/19





 06:59 06:59 06:59


 


Intake Total 408 727 674


 


Balance 408 727 674


 


Weight 211 lb 6.773 oz 215 lb 9.793 oz 











General appearance: PRESENT: mild distress, obese, other - BiPAP


Head exam: PRESENT: atraumatic, normocephalic


Eye exam: ABSENT: conjunctival injection


Mouth exam: PRESENT: moist, neck supple


Neck exam: ABSENT: meningismus, tenderness


Respiratory exam: PRESENT: prolonged expiratory phas, wheezes.  ABSENT: 

accessory muscle use


Cardiovascular exam: PRESENT: RRR


Pulses: PRESENT: normal radial pulses


GI/Abdominal exam: PRESENT: normal bowel sounds, soft


Rectal exam: PRESENT: deferred


Extremities exam: ABSENT: joint swelling, pedal edema


Neurological exam: PRESENT: alert, awake, oriented to person, oriented to place,

oriented to time, oriented to situation


Psychiatric exam: PRESENT: anxious





Results


Laboratory Results: 


                                        





                                 01/30/19 05:37 





                                 01/30/19 05:37 





                                        











  01/30/19 01/30/19 01/30/19





  05:37 05:37 06:25


 


WBC  10.6 H  


 


RBC  4.36  


 


Hgb  13.9  


 


Hct  41.5  


 


MCV  95  


 


MCH  31.9  


 


MCHC  33.5  


 


RDW  14.6 H  


 


Plt Count  139 L  


 


Carbonic Acid    1.78 H


 


HCO3/H2CO3 Ratio    18:1


 


ABG pH    7.35


 


ABG pCO2    59.3 H


 


ABG pO2    83.8


 


ABG HCO3    32.2 H


 


ABG O2 Saturation    95.6


 


ABG Base Excess    4.8


 


FiO2    3L


 


Sodium   141.7 


 


Potassium   4.7 


 


Chloride   101 


 


Carbon Dioxide   31 H 


 


Anion Gap   10 


 


BUN   19 


 


Creatinine   0.53 


 


Est GFR ( Amer)   > 60 


 


Est GFR (Non-Af Amer)   > 60 


 


Glucose   256 H 


 


Calcium   9.0 








                                        











  01/27/19





  06:05


 


Troponin I  < 0.012











Impressions: 


                                        





Chest CT  01/27/19 00:00


IMPRESSION:  1. Emphysema.  Tree-in-bud opacities at the bilateral upper lobes, 

suggestive of acute infection/ inflammation of the smaller airways such as 

bronchiolitis or bronchopneumonia.


2. 4.2 x 4.2 cm ascending thoracic aortic aneurysm.


 








Chest X-Ray  01/29/19 06:00


IMPRESSION:  Improved but persistent hazy left lingular opacities.


 














Assessment & Plan





- Diagnosis


(1) Acute respiratory failure with hypercapnia


Is this a current diagnosis for this admission?: Yes   


Plan: 


continue BiPAP and titrate as tolerated.  Currently she is using during sleep 

and intermittently during the day.  Appreciate consult from pulmonology.may need

BiPAP on discharge.  








(2) COPD exacerbation


Is this a current diagnosis for this admission?: Yes   


Plan: 


Continue IV solumedrol (dose decreased), aggressive pulmonary toilet, 

bronchodilators   








(3) Pneumonia


Qualifiers: 


   Pneumonia type: due to unspecified organism   Laterality: unspecified 

laterality   Lung location: unspecified part of lung   Qualified Code(s): J18.9 

- Pneumonia, unspecified organism   


Is this a current diagnosis for this admission?: Yes   


Plan: 


Continue empiric ceftriaxone and azithromycin.  Monitor blood culture.

## 2019-01-31 LAB
ANION GAP SERPL CALC-SCNC: 8 MMOL/L (ref 5–19)
ARTERIAL BLOOD H2CO3: 1.83 MMOL/L (ref 1.05–1.35)
ARTERIAL BLOOD HCO3: 33.3 MMOL/L (ref 20–24)
ARTERIAL BLOOD PCO2: 60.7 MMHG (ref 35–45)
ARTERIAL BLOOD PH: 7.36 (ref 7.35–7.45)
ARTERIAL BLOOD PO2: 101.8 MMHG (ref 80–100)
ARTERIAL BLOOD TOTAL CO2: 35.2 MMOL/L (ref 21–25)
BASE EXCESS BLDA CALC-SCNC: 5.8 MMOL/L
BUN SERPL-MCNC: 18 MG/DL (ref 7–20)
CALCIUM: 8.9 MG/DL (ref 8.4–10.2)
CHLORIDE SERPL-SCNC: 99 MMOL/L (ref 98–107)
CO2 SERPL-SCNC: 34 MMOL/L (ref 22–30)
ERYTHROCYTE [DISTWIDTH] IN BLOOD BY AUTOMATED COUNT: 14.4 % (ref 11.5–14)
GLUCOSE SERPL-MCNC: 225 MG/DL (ref 75–110)
HCT VFR BLD CALC: 41.6 % (ref 36–47)
HGB BLD-MCNC: 14.1 G/DL (ref 12–15.5)
MCH RBC QN AUTO: 32 PG (ref 27–33.4)
MCHC RBC AUTO-ENTMCNC: 33.8 G/DL (ref 32–36)
MCV RBC AUTO: 95 FL (ref 80–97)
PLATELET # BLD: 141 10^3/UL (ref 150–450)
POTASSIUM SERPL-SCNC: 4.8 MMOL/L (ref 3.6–5)
RBC # BLD AUTO: 4.4 10^6/UL (ref 3.72–5.28)
SAO2 % BLDA: 97.3 % (ref 94–98)
SODIUM SERPL-SCNC: 141.3 MMOL/L (ref 137–145)
WBC # BLD AUTO: 9.6 10^3/UL (ref 4–10.5)

## 2019-01-31 RX ADMIN — AZITHROMYCIN SCH MG: 250 TABLET, FILM COATED ORAL at 10:53

## 2019-01-31 RX ADMIN — ACETAMINOPHEN PRN MG: 325 TABLET ORAL at 21:36

## 2019-01-31 RX ADMIN — PREGABALIN SCH MG: 100 CAPSULE ORAL at 15:30

## 2019-01-31 RX ADMIN — METHYLPREDNISOLONE SODIUM SUCCINATE SCH MG: 40 INJECTION, POWDER, FOR SOLUTION INTRAMUSCULAR; INTRAVENOUS at 04:41

## 2019-01-31 RX ADMIN — IPRATROPIUM BROMIDE AND ALBUTEROL SULFATE SCH ML: 2.5; .5 SOLUTION RESPIRATORY (INHALATION) at 19:37

## 2019-01-31 RX ADMIN — FLUTICASONE PROPIONATE AND SALMETEROL SCH INHALER: 50; 250 POWDER RESPIRATORY (INHALATION) at 10:54

## 2019-01-31 RX ADMIN — IPRATROPIUM BROMIDE AND ALBUTEROL SULFATE SCH ML: 2.5; .5 SOLUTION RESPIRATORY (INHALATION) at 15:55

## 2019-01-31 RX ADMIN — METHYLPREDNISOLONE SODIUM SUCCINATE SCH MG: 40 INJECTION, POWDER, FOR SOLUTION INTRAMUSCULAR; INTRAVENOUS at 15:30

## 2019-01-31 RX ADMIN — FLUTICASONE PROPIONATE AND SALMETEROL SCH INHALER: 50; 250 POWDER RESPIRATORY (INHALATION) at 21:31

## 2019-01-31 RX ADMIN — IPRATROPIUM BROMIDE AND ALBUTEROL SULFATE SCH ML: 2.5; .5 SOLUTION RESPIRATORY (INHALATION) at 08:28

## 2019-01-31 RX ADMIN — CLONAZEPAM SCH MG: 1 TABLET ORAL at 21:30

## 2019-01-31 RX ADMIN — IPRATROPIUM BROMIDE AND ALBUTEROL SULFATE SCH ML: 2.5; .5 SOLUTION RESPIRATORY (INHALATION) at 04:28

## 2019-01-31 RX ADMIN — IPRATROPIUM BROMIDE AND ALBUTEROL SULFATE SCH ML: 2.5; .5 SOLUTION RESPIRATORY (INHALATION) at 11:25

## 2019-01-31 RX ADMIN — ROPINIROLE HYDROCHLORIDE SCH MG: 2 TABLET, FILM COATED ORAL at 10:53

## 2019-01-31 RX ADMIN — TIOTROPIUM BROMIDE SCH CAP: 18 CAPSULE ORAL; RESPIRATORY (INHALATION) at 10:54

## 2019-01-31 RX ADMIN — PREGABALIN SCH MG: 100 CAPSULE ORAL at 21:30

## 2019-01-31 RX ADMIN — IPRATROPIUM BROMIDE AND ALBUTEROL SULFATE SCH ML: 2.5; .5 SOLUTION RESPIRATORY (INHALATION) at 23:54

## 2019-01-31 RX ADMIN — TEMAZEPAM PRN MG: 7.5 CAPSULE ORAL at 04:41

## 2019-01-31 RX ADMIN — ACETAMINOPHEN PRN MG: 325 TABLET ORAL at 04:41

## 2019-01-31 RX ADMIN — PREGABALIN SCH MG: 100 CAPSULE ORAL at 05:24

## 2019-01-31 RX ADMIN — ROPINIROLE HYDROCHLORIDE SCH MG: 2 TABLET, FILM COATED ORAL at 21:30

## 2019-01-31 RX ADMIN — CEFTRIAXONE SCH MLS/HR: 1 INJECTION, SOLUTION INTRAVENOUS at 10:53

## 2019-01-31 RX ADMIN — ENOXAPARIN SODIUM SCH MG: 40 INJECTION SUBCUTANEOUS at 10:55

## 2019-01-31 RX ADMIN — METHYLPREDNISOLONE SODIUM SUCCINATE SCH MG: 40 INJECTION, POWDER, FOR SOLUTION INTRAMUSCULAR; INTRAVENOUS at 08:37

## 2019-01-31 RX ADMIN — METHYLPREDNISOLONE SODIUM SUCCINATE SCH MG: 40 INJECTION, POWDER, FOR SOLUTION INTRAMUSCULAR; INTRAVENOUS at 21:29

## 2019-01-31 RX ADMIN — IPRATROPIUM BROMIDE AND ALBUTEROL SULFATE SCH ML: 2.5; .5 SOLUTION RESPIRATORY (INHALATION) at 00:21

## 2019-01-31 NOTE — PDOC PROGRESS REPORT
Subjective


Progress Note for:: 01/31/19


Subjective:: 





Patient was seen and examined today.  She has been on and off BiPAP.  ABG shows 

persistent hypercapnia but improved and now stable.  She is using BiPAP during 

sleep for now.  She is still coughing and wheezing and not at her baseline. 


Reason For Visit: 


COPD EXACERBATION








Physical Exam


Vital Signs: 


                                        











Temp Pulse Resp BP Pulse Ox


 


 97.5 F   71   16   124/74   96 


 


 01/31/19 07:31  01/31/19 11:25  01/31/19 11:45  01/31/19 07:31  01/31/19 11:25








                                 Intake & Output











 01/30/19 01/31/19 02/01/19





 06:59 06:59 06:59


 


Intake Total 727 914 50


 


Balance 727 914 50


 


Weight 215 lb 9.793 oz 206 lb 5.643 oz 











General appearance: PRESENT: no acute distress, cooperative, obese


Head exam: PRESENT: atraumatic, normocephalic


Mouth exam: PRESENT: moist, neck supple


Neck exam: ABSENT: meningismus, tenderness


Respiratory exam: PRESENT: prolonged expiratory phas, wheezes


Cardiovascular exam: PRESENT: RRR


Pulses: PRESENT: normal radial pulses


GI/Abdominal exam: PRESENT: normal bowel sounds, soft


Rectal exam: PRESENT: deferred


Musculoskeletal exam: PRESENT: ambulatory.  ABSENT: deformity


Neurological exam: PRESENT: alert, awake, oriented to person, oriented to place,

oriented to time, oriented to situation





Results


Laboratory Results: 


                                        





                                 01/31/19 04:19 





                                 01/31/19 04:19 





                                        











  01/31/19 01/31/19 01/31/19





  04:19 04:19 05:40


 


WBC  9.6  


 


RBC  4.40  


 


Hgb  14.1  


 


Hct  41.6  


 


MCV  95  


 


MCH  32.0  


 


MCHC  33.8  


 


RDW  14.4 H  


 


Plt Count  141 L  


 


Carbonic Acid    1.83 H


 


HCO3/H2CO3 Ratio    18:1


 


ABG pH    7.36


 


ABG pCO2    60.7 H


 


ABG pO2    101.8 H


 


ABG HCO3    33.3 H


 


ABG O2 Saturation    97.3


 


ABG Base Excess    5.8


 


FiO2    35%


 


Sodium   141.3 


 


Potassium   4.8 


 


Chloride   99 


 


Carbon Dioxide   34 H 


 


Anion Gap   8 


 


BUN   18 


 


Creatinine   0.54 


 


Est GFR ( Amer)   > 60 


 


Est GFR (Non-Af Amer)   > 60 


 


Glucose   225 H 


 


Calcium   8.9 








                                        











  01/27/19





  06:05


 


Troponin I  < 0.012











Impressions: 


                                        





Chest CT  01/27/19 00:00


IMPRESSION:  1. Emphysema.  Tree-in-bud opacities at the bilateral upper lobes, 

suggestive of acute infection/ inflammation of the smaller airways such as 

bronchiolitis or bronchopneumonia.


2. 4.2 x 4.2 cm ascending thoracic aortic aneurysm.


 








Chest X-Ray  01/31/19 06:00


IMPRESSION:  NO ACUTE RADIOGRAPHIC FINDING IN THE CHEST.


 














Assessment & Plan





- Diagnosis


(1) Acute respiratory failure with hypercapnia


Is this a current diagnosis for this admission?: Yes   


Plan: 


continue BiPAP and titrate as tolerated.  Currently she is using during sleep 

and intermittently during the day.  Appreciate consult from pulmonology. may 

need BiPAP on discharge.  








(2) COPD exacerbation


Is this a current diagnosis for this admission?: Yes   


Plan: 


Continue IV solumedrol, aggressive pulmonary toilet, bronchodilators.  Not at 

her baseline yet.   








(3) Pneumonia


Qualifiers: 


   Pneumonia type: due to unspecified organism   Laterality: unspecified 

laterality   Lung location: unspecified part of lung   Qualified Code(s): J18.9 

- Pneumonia, unspecified organism   


Is this a current diagnosis for this admission?: Yes   


Plan: 


Continue empiric ceftriaxone and azithromycin.  Monitor blood culture.

## 2019-01-31 NOTE — RADIOLOGY REPORT (SQ)
EXAM DESCRIPTION:  CHEST 2 VIEWS



COMPLETED DATE/TIME:  1/31/2019 10:51 am



REASON FOR STUDY:  pneumonia



COMPARISON:  CT chest 1/27/2019

AP chest 1/27/2019, 1/29/2019



EXAM PARAMETERS:  NUMBER OF VIEWS: two views

TECHNIQUE: Digital Frontal and Lateral radiographic views of the chest acquired.

RADIATION DOSE: NA

LIMITATIONS: none



FINDINGS:  LUNGS AND PLEURA: No opacities, masses or pneumothorax. No pleural effusion.

MEDIASTINUM AND HILAR STRUCTURES: No masses or contour abnormalities.

HEART AND VASCULAR STRUCTURES: Heart normal size.  No evidence for failure.

BONES: No acute findings.

HARDWARE: None in the chest.

OTHER: No other significant finding.



IMPRESSION:  NO ACUTE RADIOGRAPHIC FINDING IN THE CHEST.



TECHNICAL DOCUMENTATION:  JOB ID:  [id number]

 2011 [employer]- All Rights Reserved



Reading location - IP/workstation name: [email or web address]

## 2019-02-01 RX ADMIN — IPRATROPIUM BROMIDE AND ALBUTEROL SULFATE SCH ML: 2.5; .5 SOLUTION RESPIRATORY (INHALATION) at 04:19

## 2019-02-01 RX ADMIN — CEFTRIAXONE SCH MLS/HR: 1 INJECTION, SOLUTION INTRAVENOUS at 09:32

## 2019-02-01 RX ADMIN — FLUTICASONE PROPIONATE AND SALMETEROL SCH INHALER: 50; 250 POWDER RESPIRATORY (INHALATION) at 21:17

## 2019-02-01 RX ADMIN — METHYLPREDNISOLONE SODIUM SUCCINATE SCH MG: 40 INJECTION, POWDER, FOR SOLUTION INTRAMUSCULAR; INTRAVENOUS at 09:30

## 2019-02-01 RX ADMIN — ENOXAPARIN SODIUM SCH MG: 40 INJECTION SUBCUTANEOUS at 09:31

## 2019-02-01 RX ADMIN — PREGABALIN SCH MG: 100 CAPSULE ORAL at 21:17

## 2019-02-01 RX ADMIN — PREGABALIN SCH MG: 100 CAPSULE ORAL at 05:23

## 2019-02-01 RX ADMIN — ROPINIROLE HYDROCHLORIDE SCH MG: 2 TABLET, FILM COATED ORAL at 21:17

## 2019-02-01 RX ADMIN — METHYLPREDNISOLONE SODIUM SUCCINATE SCH MG: 40 INJECTION, POWDER, FOR SOLUTION INTRAMUSCULAR; INTRAVENOUS at 14:38

## 2019-02-01 RX ADMIN — ROPINIROLE HYDROCHLORIDE SCH MG: 2 TABLET, FILM COATED ORAL at 09:31

## 2019-02-01 RX ADMIN — IPRATROPIUM BROMIDE AND ALBUTEROL SULFATE SCH ML: 2.5; .5 SOLUTION RESPIRATORY (INHALATION) at 20:27

## 2019-02-01 RX ADMIN — ACETAMINOPHEN PRN MG: 325 TABLET ORAL at 11:57

## 2019-02-01 RX ADMIN — IPRATROPIUM BROMIDE AND ALBUTEROL SULFATE SCH ML: 2.5; .5 SOLUTION RESPIRATORY (INHALATION) at 12:14

## 2019-02-01 RX ADMIN — IPRATROPIUM BROMIDE AND ALBUTEROL SULFATE SCH ML: 2.5; .5 SOLUTION RESPIRATORY (INHALATION) at 16:16

## 2019-02-01 RX ADMIN — IPRATROPIUM BROMIDE AND ALBUTEROL SULFATE SCH ML: 2.5; .5 SOLUTION RESPIRATORY (INHALATION) at 07:54

## 2019-02-01 RX ADMIN — TIOTROPIUM BROMIDE SCH CAP: 18 CAPSULE ORAL; RESPIRATORY (INHALATION) at 09:32

## 2019-02-01 RX ADMIN — AZITHROMYCIN SCH MG: 250 TABLET, FILM COATED ORAL at 09:33

## 2019-02-01 RX ADMIN — TEMAZEPAM PRN MG: 7.5 CAPSULE ORAL at 03:28

## 2019-02-01 RX ADMIN — CLONAZEPAM SCH MG: 1 TABLET ORAL at 21:17

## 2019-02-01 RX ADMIN — PREGABALIN SCH MG: 100 CAPSULE ORAL at 14:38

## 2019-02-01 RX ADMIN — FLUTICASONE PROPIONATE AND SALMETEROL SCH INHALER: 50; 250 POWDER RESPIRATORY (INHALATION) at 09:31

## 2019-02-01 RX ADMIN — METHYLPREDNISOLONE SODIUM SUCCINATE SCH MG: 40 INJECTION, POWDER, FOR SOLUTION INTRAMUSCULAR; INTRAVENOUS at 02:16

## 2019-02-01 RX ADMIN — METHYLPREDNISOLONE SODIUM SUCCINATE SCH MG: 40 INJECTION, POWDER, FOR SOLUTION INTRAMUSCULAR; INTRAVENOUS at 21:17

## 2019-02-01 NOTE — PDOC PROGRESS REPORT
Subjective


Progress Note for:: 02/01/19


Subjective:: 





Patient was seen and examined today.  Currently on BiPAP.


She has been on and off BiPAP.  She still does not feel that she is able to go 

home.


Reason For Visit: 


COPD EXACERBATION








Physical Exam


Vital Signs: 


                                        











Temp Pulse Resp BP Pulse Ox


 


 97.5 F   69   18   150/81 H  93 


 


 02/01/19 15:33  02/01/19 16:16  02/01/19 16:16  02/01/19 15:33  02/01/19 16:16








                                 Intake & Output











 01/31/19 02/01/19 02/02/19





 06:59 06:59 06:59


 


Intake Total 914 968 286


 


Balance 914 968 286


 


Weight 206 lb 5.643 oz 208 lb 5.389 oz 











General appearance: PRESENT: cooperative, mild distress, obese


Head exam: PRESENT: atraumatic, normocephalic


Eye exam: ABSENT: conjunctival injection


Mouth exam: PRESENT: moist, neck supple


Neck exam: ABSENT: meningismus, tenderness


Respiratory exam: PRESENT: accessory muscle use, rhonchi, wheezes


Cardiovascular exam: PRESENT: RRR


Pulses: PRESENT: normal radial pulses


GI/Abdominal exam: PRESENT: normal bowel sounds, soft.  ABSENT: tenderness


Rectal exam: PRESENT: deferred


Musculoskeletal exam: PRESENT: ambulatory.  ABSENT: deformity


Neurological exam: PRESENT: alert, awake, oriented to person, oriented to place,

oriented to time, oriented to situation





Results


Laboratory Results: 


                                        





                                 01/31/19 04:19 





                                 01/31/19 04:19 





                                        





01/27/19 08:33   Blood   Blood Culture - Final


                            NO GROWTH IN 5 DAYS


01/27/19 06:05   Blood   Blood Culture - Final


                            NO GROWTH IN 5 DAYS





                                        











  01/27/19





  06:05


 


Troponin I  < 0.012











Impressions: 


                                        





Chest CT  01/27/19 00:00


IMPRESSION:  1. Emphysema.  Tree-in-bud opacities at the bilateral upper lobes, 

suggestive of acute infection/ inflammation of the smaller airways such as 

bronchiolitis or bronchopneumonia.


2. 4.2 x 4.2 cm ascending thoracic aortic aneurysm.


 








Chest X-Ray  01/31/19 06:00


IMPRESSION:  NO ACUTE RADIOGRAPHIC FINDING IN THE CHEST.


 














Assessment & Plan





- Diagnosis


(1) Acute respiratory failure with hypercapnia


Is this a current diagnosis for this admission?: Yes   


Plan: 


continue BiPAP and titrate as tolerated.  she is using during sleep and 

intermittently during the day.  


Appreciate consult from pulmonology. may need BiPAP on discharge.  








(2) COPD exacerbation


Is this a current diagnosis for this admission?: Yes   


Plan: 


Continue IV solumedrol, aggressive pulmonary toilet, bronchodilators.  Not at 

her baseline yet.    








(3) Pneumonia


Qualifiers: 


   Pneumonia type: due to unspecified organism   Laterality: unspecified 

laterality   Lung location: unspecified part of lung   Qualified Code(s): J18.9 

- Pneumonia, unspecified organism   


Is this a current diagnosis for this admission?: Yes   


Plan: 


Continue empiric ceftriaxone and azithromycin.  Monitor blood culture.

## 2019-02-02 LAB
ANION GAP SERPL CALC-SCNC: 8 MMOL/L (ref 5–19)
ARTERIAL BLOOD FIO2: (no result)
ARTERIAL BLOOD H2CO3: 1.79 MMOL/L (ref 1.05–1.35)
ARTERIAL BLOOD HCO3: 35.1 MMOL/L (ref 20–24)
ARTERIAL BLOOD PCO2: 59.6 MMHG (ref 35–45)
ARTERIAL BLOOD PH: 7.39 (ref 7.35–7.45)
ARTERIAL BLOOD PO2: 96 MMHG (ref 80–100)
ARTERIAL BLOOD TOTAL CO2: 36.9 MMOL/L (ref 21–25)
BASE EXCESS BLDA CALC-SCNC: 7.9 MMOL/L
BUN SERPL-MCNC: 21 MG/DL (ref 7–20)
CALCIUM: 8.7 MG/DL (ref 8.4–10.2)
CHLORIDE SERPL-SCNC: 97 MMOL/L (ref 98–107)
CK MB SERPL-MCNC: 2.02 NG/ML (ref ?–4.55)
CK SERPL-CCNC: 52 U/L (ref 30–135)
CO2 SERPL-SCNC: 34 MMOL/L (ref 22–30)
ERYTHROCYTE [DISTWIDTH] IN BLOOD BY AUTOMATED COUNT: 14 % (ref 11.5–14)
GLUCOSE SERPL-MCNC: 287 MG/DL (ref 75–110)
HCT VFR BLD CALC: 40.6 % (ref 36–47)
HGB BLD-MCNC: 13.9 G/DL (ref 12–15.5)
MCH RBC QN AUTO: 32.2 PG (ref 27–33.4)
MCHC RBC AUTO-ENTMCNC: 34.3 G/DL (ref 32–36)
MCV RBC AUTO: 94 FL (ref 80–97)
PLATELET # BLD: 145 10^3/UL (ref 150–450)
POTASSIUM SERPL-SCNC: 4.4 MMOL/L (ref 3.6–5)
POTASSIUM SERPL-SCNC: 4.9 MMOL/L (ref 3.6–5)
RBC # BLD AUTO: 4.33 10^6/UL (ref 3.72–5.28)
SAO2 % BLDA: 97.1 % (ref 94–98)
SODIUM SERPL-SCNC: 138.6 MMOL/L (ref 137–145)
TROPONIN I SERPL-MCNC: < 0.012 NG/ML
WBC # BLD AUTO: 8.4 10^3/UL (ref 4–10.5)

## 2019-02-02 RX ADMIN — TEMAZEPAM PRN MG: 7.5 CAPSULE ORAL at 03:33

## 2019-02-02 RX ADMIN — FLUTICASONE PROPIONATE AND SALMETEROL SCH INHALER: 50; 250 POWDER RESPIRATORY (INHALATION) at 21:33

## 2019-02-02 RX ADMIN — LANSOPRAZOLE SCH: 30 TABLET, ORALLY DISINTEGRATING, DELAYED RELEASE ORAL at 13:55

## 2019-02-02 RX ADMIN — AZITHROMYCIN SCH MG: 250 TABLET, FILM COATED ORAL at 10:22

## 2019-02-02 RX ADMIN — IPRATROPIUM BROMIDE AND ALBUTEROL SULFATE SCH ML: 2.5; .5 SOLUTION RESPIRATORY (INHALATION) at 04:21

## 2019-02-02 RX ADMIN — METHYLPREDNISOLONE SODIUM SUCCINATE SCH MG: 40 INJECTION, POWDER, FOR SOLUTION INTRAMUSCULAR; INTRAVENOUS at 10:20

## 2019-02-02 RX ADMIN — PREGABALIN SCH MG: 100 CAPSULE ORAL at 06:25

## 2019-02-02 RX ADMIN — ROPINIROLE HYDROCHLORIDE SCH MG: 2 TABLET, FILM COATED ORAL at 21:32

## 2019-02-02 RX ADMIN — CLONAZEPAM SCH MG: 1 TABLET ORAL at 21:32

## 2019-02-02 RX ADMIN — GUAIFENESIN SCH MG: 600 TABLET, EXTENDED RELEASE ORAL at 13:49

## 2019-02-02 RX ADMIN — METHYLPREDNISOLONE SODIUM SUCCINATE SCH MG: 40 INJECTION, POWDER, FOR SOLUTION INTRAMUSCULAR; INTRAVENOUS at 03:33

## 2019-02-02 RX ADMIN — NYSTATIN SCH UNIT: 100000 SUSPENSION ORAL at 17:02

## 2019-02-02 RX ADMIN — IPRATROPIUM BROMIDE AND ALBUTEROL SULFATE SCH ML: 2.5; .5 SOLUTION RESPIRATORY (INHALATION) at 13:25

## 2019-02-02 RX ADMIN — IPRATROPIUM BROMIDE AND ALBUTEROL SULFATE SCH ML: 2.5; .5 SOLUTION RESPIRATORY (INHALATION) at 07:45

## 2019-02-02 RX ADMIN — PREDNISONE SCH MG: 20 TABLET ORAL at 13:50

## 2019-02-02 RX ADMIN — CEFTRIAXONE SCH MLS/HR: 1 INJECTION, SOLUTION INTRAVENOUS at 10:58

## 2019-02-02 RX ADMIN — ACETAMINOPHEN PRN MG: 325 TABLET ORAL at 07:10

## 2019-02-02 RX ADMIN — IPRATROPIUM BROMIDE AND ALBUTEROL SULFATE SCH ML: 2.5; .5 SOLUTION RESPIRATORY (INHALATION) at 19:59

## 2019-02-02 RX ADMIN — NYSTATIN SCH UNIT: 100000 SUSPENSION ORAL at 13:50

## 2019-02-02 RX ADMIN — IPRATROPIUM BROMIDE AND ALBUTEROL SULFATE SCH ML: 2.5; .5 SOLUTION RESPIRATORY (INHALATION) at 00:30

## 2019-02-02 RX ADMIN — IPRATROPIUM BROMIDE AND ALBUTEROL SULFATE SCH ML: 2.5; .5 SOLUTION RESPIRATORY (INHALATION) at 16:41

## 2019-02-02 RX ADMIN — PREGABALIN SCH MG: 100 CAPSULE ORAL at 13:49

## 2019-02-02 RX ADMIN — FLUTICASONE PROPIONATE AND SALMETEROL SCH INHALER: 50; 250 POWDER RESPIRATORY (INHALATION) at 10:22

## 2019-02-02 RX ADMIN — TIOTROPIUM BROMIDE SCH CAP: 18 CAPSULE ORAL; RESPIRATORY (INHALATION) at 10:21

## 2019-02-02 RX ADMIN — ROPINIROLE HYDROCHLORIDE SCH MG: 2 TABLET, FILM COATED ORAL at 10:22

## 2019-02-02 RX ADMIN — GUAIFENESIN SCH MG: 600 TABLET, EXTENDED RELEASE ORAL at 21:32

## 2019-02-02 RX ADMIN — ENOXAPARIN SODIUM SCH MG: 40 INJECTION SUBCUTANEOUS at 10:23

## 2019-02-02 RX ADMIN — PREGABALIN SCH MG: 100 CAPSULE ORAL at 21:32

## 2019-02-02 RX ADMIN — NYSTATIN SCH UNIT: 100000 SUSPENSION ORAL at 21:32

## 2019-02-02 NOTE — RADIOLOGY REPORT (SQ)
EXAM DESCRIPTION:  CHEST 2 VIEWS



COMPLETED DATE/TIME:  2/2/2019 10:56 am



REASON FOR STUDY:  copd



COMPARISON:  1/31/2019



EXAM PARAMETERS:  NUMBER OF VIEWS: two views

TECHNIQUE: Digital Frontal and Lateral radiographic views of the chest acquired.

RADIATION DOSE: NA

LIMITATIONS: none



FINDINGS:  LUNGS AND PLEURA: No opacities, masses or pneumothorax. No pleural effusion.

MEDIASTINUM AND HILAR STRUCTURES: No masses or contour abnormalities.

HEART AND VASCULAR STRUCTURES: Heart normal size.  No evidence for failure.

BONES: No acute findings.

HARDWARE: None in the chest.

OTHER: No other significant finding.



IMPRESSION:  NO ACUTE RADIOGRAPHIC FINDING IN THE CHEST.



TECHNICAL DOCUMENTATION:  JOB ID:  [id number]

 2011 [employer]- All Rights Reserved



Reading location - IP/workstation name: [email or web address]

## 2019-02-02 NOTE — PDOC PROGRESS REPORT
Subjective


Progress Note for:: 02/02/19


Subjective:: 





Patient was seen and examined today.  on BiPAP.


She has been on and off BiPAP.  She still does not feel that she is able to go 

home. still wheezing a lot. 


Reason For Visit: 


COPD EXACERBATION








Physical Exam


Vital Signs: 


                                        











Temp Pulse Resp BP Pulse Ox


 


 97.7 F   93   20   119/57 L  94 


 


 02/02/19 08:36  02/02/19 08:36  02/02/19 08:36  02/02/19 08:36  02/02/19 08:36








                                 Intake & Output











 02/01/19 02/02/19 02/03/19





 06:59 06:59 06:59


 


Intake Total 968 913 


 


Balance 968 913 


 


Weight 208 lb 5.389 oz 212 lb 8.41 oz 











General appearance: PRESENT: mild distress, obese


Head exam: PRESENT: atraumatic, normocephalic


Mouth exam: PRESENT: moist, neck supple, other - candidiasis


Neck exam: ABSENT: meningismus, tenderness


Respiratory exam: PRESENT: rhonchi, wheezes.  ABSENT: accessory muscle use


Cardiovascular exam: PRESENT: RRR


Pulses: PRESENT: normal radial pulses


GI/Abdominal exam: PRESENT: normal bowel sounds, soft


Rectal exam: PRESENT: deferred


Musculoskeletal exam: PRESENT: ambulatory.  ABSENT: deformity


Neurological exam: PRESENT: alert, awake, oriented to person, oriented to place,

oriented to time, oriented to situation





Results


Laboratory Results: 


                                        





                                 02/02/19 04:28 





                                 02/02/19 04:28 





                                        











  02/02/19 02/02/19 02/02/19





  04:28 04:28 06:20


 


WBC  8.4  


 


RBC  4.33  


 


Hgb  13.9  


 


Hct  40.6  


 


MCV  94  


 


MCH  32.2  


 


MCHC  34.3  


 


RDW  14.0  


 


Plt Count  145 L  


 


Carbonic Acid    1.79 H


 


HCO3/H2CO3 Ratio    19:1


 


ABG pH    7.39


 


ABG pCO2    59.6 H


 


ABG pO2    96.0


 


ABG HCO3    35.1 H


 


ABG O2 Saturation    97.1


 


ABG Base Excess    7.9


 


FiO2    30%


 


Sodium   138.6 


 


Potassium   4.9 


 


Chloride   97 L 


 


Carbon Dioxide   34 H 


 


Anion Gap   8 


 


BUN   21 H 


 


Creatinine   0.57 


 


Est GFR ( Amer)   > 60 


 


Est GFR (Non-Af Amer)   > 60 


 


Glucose   287 H 


 


Calcium   8.7 








                                        





01/27/19 08:33   Blood   Blood Culture - Final


                            NO GROWTH IN 5 DAYS


01/27/19 06:05   Blood   Blood Culture - Final


                            NO GROWTH IN 5 DAYS





                                        











  01/27/19





  06:05


 


Troponin I  < 0.012











Impressions: 


                                        





Chest CT  01/27/19 00:00


IMPRESSION:  1. Emphysema.  Tree-in-bud opacities at the bilateral upper lobes, 

suggestive of acute infection/ inflammation of the smaller airways such as 

bronchiolitis or bronchopneumonia.


2. 4.2 x 4.2 cm ascending thoracic aortic aneurysm.


 








Chest X-Ray  02/02/19 06:00


IMPRESSION:  NO ACUTE RADIOGRAPHIC FINDING IN THE CHEST.


 














Assessment & Plan





- Diagnosis


(1) Acute respiratory failure with hypercapnia


Is this a current diagnosis for this admission?: Yes   


Plan: 


Trial of AVAPS








(2) COPD exacerbation


Is this a current diagnosis for this admission?: Yes   


Plan: 


Switch IV solumedrol to oral prednisone.  Continue aggressive pulmonary toilet, 

bronchodilators.  Not at her baseline yet.  Trial of AVAPS   








(3) Pneumonia


Qualifiers: 


   Pneumonia type: due to unspecified organism   Laterality: unspecified 

laterality   Lung location: unspecified part of lung   Qualified Code(s): J18.9 

- Pneumonia, unspecified organism   


Is this a current diagnosis for this admission?: Yes   


Plan: 


Continue empiric ceftriaxone and azithromycin.  Negative blood culture.

## 2019-02-02 NOTE — EKG REPORT
SEVERITY:- BORDERLINE ECG -

SINUS RHYTHM

VENTRICULAR PREMATURE COMPLEX

PROBABLE LEFT ATRIAL ABNORMALITY

:

Confirmed by: Barak Dale MD 02-Feb-2019 17:14:58

## 2019-02-02 NOTE — XCELERA REPORT
[address] [address]

                               Tel: [phone]

                               Fax: [phone]



                      Transthoracic Echocardiogram Report

_______________________________________________________________________________



Name: DANIELA STEEL

MRN: [id number]                           Age: 53 yrs

Gender: Female                            : 1965

Patient Status: Inpatient                 Patient Location: [facility]A

Account #: [id number]

Study Date: 2019 07:11 PM

Accession #: [id number]

_______________________________________________________________________________



Height: 59 in        Weight: 212 lb        BSA: 1.9 m2

_______________________________________________________________________________

Procedure: A two-dimensional transthoracic echocardiogram with color flow and

Doppler was performed. Study Quality: Poor. The study was technically

difficult with many images being suboptimal in quality.

Reason For Study: Chest Pain, Bigeminy



History: Chest pain.

Ordering Physician: ALEXY IRVING



Performed By: Fito Barnhart

_______________________________________________________________________________



Interpretation Summary

The left ventricle is normal in size.

There is normal left ventricular wall thickness.

LV EF is 65%

The left ventricular ejection fraction is normal.

Doppler measurements suggest normal left ventricular diastolic function

Probably no regional wall motion abnormalities.

There is no evidence of mitral valve prolapse.

There is no mitral valve stenosis.

There is a trace amount of mitral regurgitation

There is no aortic valve stenosis

No aortic regurgitation is present.

There is no tricuspid stenosis.

There is a trace amount of tricuspid regurgitation

Unable to calculate RVSP due lack of TR jet.

There is no pericardial effusion.



MMode/2D Measurements & Calculations

RVDd: 3.2 cm       LVIDd: 5.2 cm  FS: 33.6 %            Ao root diam: 3.3 cm



IVSd: 0.81 cm      LVIDs: 3.5 cm  EDV(Teich): 129.4 ml  Ao root area: 8.7 cm2

                   LVPWd: 0.95 cm ESV(Teich): 49.2 ml   LA dimension: 2.3 cm

                                  EF(Teich): 62.0 %

        _______________________________________________________________

LVOT diam: 1.6 cm



LVOT area: 2.0 cm2



Doppler Measurements & Calculations

MV E max carlitos:      MV P1/2t max carlitos:      Ao V2 max:        LV V1 max P.8 cm/sec        109.4 cm/sec           171.4 cm/sec      5.8 mmHg

MV A max carlitos:      MV P1/2t: 51.9 msec    Ao max PG:        LV V1 max:

80.0 cm/sec        MVA(P1/2t): 4.2 cm2    11.8 mmHg         119.9 cm/sec

MV E/A: 1.1        MV dec slope:          MALIK(V,D): 1.4 cm2



                   616.8 cm/sec2

                   MV dec time: 0.19 sec

        _______________________________________________________________

PA V2 max:         MV P1/2t-pr_phl:

120.8 cm/sec       51.9 msec

PA max P.8 mmHg





Left Ventricle

The left ventricle is normal in size. There is normal left ventricular wall

thickness. LV EF is 65%. The left ventricular ejection fraction is normal.

Doppler measurements suggest normal left ventricular diastolic function.

Regional wall motion abnormalities cannot be excluded due to limited

visualization. Probably no regional wall motion abnormalities.



Right Ventricle

The right ventricle is not well visualized secondary to technical limitations.



Atria

Right atrium not well visualized secondary to technical limitations. The left

atrial size is normal.



Mitral Valve

There is no evidence of mitral valve prolapse. There is no mitral valve

stenosis. There is a trace amount of mitral regurgitation.



Aortic Valve

There is no aortic valve stenosis. No aortic regurgitation is present.





Tricuspid Valve

There is no tricuspid stenosis. There is a trace amount of tricuspid

regurgitation. Unable to calculate RVSP due lack of TR jet.



Pulmonic Valve

The pulmonic valve is not well visualized.



Great Vessels

The aortic root is not well visualized.



Effusions

There is no pericardial effusion.





_______________________________________________________________________________

_______________________________________________________________________________



Electronically signed by:      Tabatha Silvestre      on 2019 09:19 PM



CC: ALEXY IRVING

>

Tabatha Silvestre

## 2019-02-03 RX ADMIN — IPRATROPIUM BROMIDE AND ALBUTEROL SULFATE SCH ML: 2.5; .5 SOLUTION RESPIRATORY (INHALATION) at 04:43

## 2019-02-03 RX ADMIN — NYSTATIN SCH UNIT: 100000 SUSPENSION ORAL at 21:52

## 2019-02-03 RX ADMIN — CLONAZEPAM SCH MG: 1 TABLET ORAL at 21:52

## 2019-02-03 RX ADMIN — ROPINIROLE HYDROCHLORIDE SCH MG: 2 TABLET, FILM COATED ORAL at 11:02

## 2019-02-03 RX ADMIN — NYSTATIN SCH UNIT: 100000 SUSPENSION ORAL at 14:05

## 2019-02-03 RX ADMIN — IPRATROPIUM BROMIDE AND ALBUTEROL SULFATE SCH ML: 2.5; .5 SOLUTION RESPIRATORY (INHALATION) at 08:05

## 2019-02-03 RX ADMIN — IPRATROPIUM BROMIDE AND ALBUTEROL SULFATE SCH ML: 2.5; .5 SOLUTION RESPIRATORY (INHALATION) at 00:31

## 2019-02-03 RX ADMIN — AZITHROMYCIN SCH MG: 250 TABLET, FILM COATED ORAL at 11:02

## 2019-02-03 RX ADMIN — TIOTROPIUM BROMIDE SCH CAP: 18 CAPSULE ORAL; RESPIRATORY (INHALATION) at 11:03

## 2019-02-03 RX ADMIN — NYSTATIN SCH UNIT: 100000 SUSPENSION ORAL at 17:16

## 2019-02-03 RX ADMIN — NYSTATIN SCH UNIT: 100000 SUSPENSION ORAL at 11:02

## 2019-02-03 RX ADMIN — IPRATROPIUM BROMIDE AND ALBUTEROL SULFATE SCH ML: 2.5; .5 SOLUTION RESPIRATORY (INHALATION) at 16:26

## 2019-02-03 RX ADMIN — FLUTICASONE PROPIONATE AND SALMETEROL SCH INHALER: 50; 250 POWDER RESPIRATORY (INHALATION) at 21:52

## 2019-02-03 RX ADMIN — PREGABALIN SCH MG: 100 CAPSULE ORAL at 14:05

## 2019-02-03 RX ADMIN — GUAIFENESIN SCH MG: 600 TABLET, EXTENDED RELEASE ORAL at 11:02

## 2019-02-03 RX ADMIN — IPRATROPIUM BROMIDE AND ALBUTEROL SULFATE SCH ML: 2.5; .5 SOLUTION RESPIRATORY (INHALATION) at 11:40

## 2019-02-03 RX ADMIN — ENOXAPARIN SODIUM SCH MG: 40 INJECTION SUBCUTANEOUS at 11:02

## 2019-02-03 RX ADMIN — PREGABALIN SCH MG: 100 CAPSULE ORAL at 21:51

## 2019-02-03 RX ADMIN — PREGABALIN SCH MG: 100 CAPSULE ORAL at 05:27

## 2019-02-03 RX ADMIN — PREDNISONE SCH MG: 20 TABLET ORAL at 11:02

## 2019-02-03 RX ADMIN — GUAIFENESIN SCH MG: 600 TABLET, EXTENDED RELEASE ORAL at 21:52

## 2019-02-03 RX ADMIN — ROPINIROLE HYDROCHLORIDE SCH MG: 2 TABLET, FILM COATED ORAL at 21:52

## 2019-02-03 RX ADMIN — IPRATROPIUM BROMIDE AND ALBUTEROL SULFATE SCH ML: 2.5; .5 SOLUTION RESPIRATORY (INHALATION) at 19:58

## 2019-02-03 RX ADMIN — FLUTICASONE PROPIONATE AND SALMETEROL SCH INHALER: 50; 250 POWDER RESPIRATORY (INHALATION) at 11:03

## 2019-02-03 RX ADMIN — LANSOPRAZOLE SCH MG: 30 TABLET, ORALLY DISINTEGRATING, DELAYED RELEASE ORAL at 05:27

## 2019-02-03 NOTE — PDOC PROGRESS REPORT
Subjective


Progress Note for:: 02/03/19


Subjective:: 





Patient was seen and examined today.  Patient is off BiPAP.  She is on nasal 

cannula oxygen.  She was sitting eating breakfast and appears in no distress.


She has been on and off BiPAP.  She still does not feel that she is able to go 

home. 


Reason For Visit: 


COPD EXACERBATION








Physical Exam


Vital Signs: 


                                        











Temp Pulse Resp BP Pulse Ox


 


 97.5 F   89   20   146/61 H  93 


 


 02/03/19 07:30  02/03/19 11:41  02/03/19 11:55  02/03/19 07:30  02/03/19 11:55








                                 Intake & Output











 02/02/19 02/03/19 02/04/19





 06:59 06:59 06:59


 


Intake Total 913 1304 


 


Balance 913 1304 


 


Weight 212 lb 8.41 oz 213 lb 6.519 oz 











General appearance: PRESENT: no acute distress, cooperative, obese


Head exam: PRESENT: atraumatic, normocephalic


Eye exam: PRESENT: EOMI


Mouth exam: PRESENT: moist, neck supple


Neck exam: ABSENT: meningismus, tenderness


Respiratory exam: PRESENT: clear to auscultation soha.  ABSENT: accessory muscle 

use


Cardiovascular exam: PRESENT: RRR


Pulses: PRESENT: normal radial pulses


GI/Abdominal exam: PRESENT: normal bowel sounds, soft


Rectal exam: PRESENT: deferred


Musculoskeletal exam: ABSENT: deformity


Neurological exam: PRESENT: alert, awake, oriented to person, oriented to place,

oriented to time, oriented to situation


Psychiatric exam: PRESENT: anxious





Results


Laboratory Results: 


                                        





                                 02/02/19 04:28 





                                 02/02/19 16:30 





                                        











  02/02/19





  16:30


 


Potassium  4.4


 


Magnesium  2.3








                                        











  01/27/19 02/02/19 02/02/19





  06:05 16:30 16:30


 


Creatine Kinase   52 


 


CK-MB (CK-2)    2.02


 


Troponin I  < 0.012   < 0.012














  02/02/19 02/03/19 02/03/19





  22:15 04:34 10:22


 


Creatine Kinase   


 


CK-MB (CK-2)   


 


Troponin I  < 0.012  < 0.012  < 0.012











Impressions: 


                                        





Chest CT  01/27/19 00:00


IMPRESSION:  1. Emphysema.  Tree-in-bud opacities at the bilateral upper lobes, 

suggestive of acute infection/ inflammation of the smaller airways such as 

bronchiolitis or bronchopneumonia.


2. 4.2 x 4.2 cm ascending thoracic aortic aneurysm.


 








Chest X-Ray  02/02/19 06:00


IMPRESSION:  NO ACUTE RADIOGRAPHIC FINDING IN THE CHEST.


 














Assessment & Plan





- Diagnosis


(1) Acute respiratory failure with hypercapnia


Is this a current diagnosis for this admission?: Yes   


Plan: 


Trial of AVAPS.  She is currently on nasal cannula oxygen chronically.  She uses

BiPAP when she sleeps.








(2) COPD exacerbation


Is this a current diagnosis for this admission?: Yes   


Plan: 


Switched IV solumedrol to oral prednisone.  Continue aggressive pulmonary 

toilet, bronchodilators.  Not at her baseline yet.  We ask for an evaluation for

outpatient AVAPS








(3) Pneumonia


Qualifiers: 


   Pneumonia type: due to unspecified organism   Laterality: unspecified 

laterality   Lung location: unspecified part of lung   Qualified Code(s): J18.9 

- Pneumonia, unspecified organism   


Is this a current diagnosis for this admission?: Yes   


Plan: 


Patient finished a course of empiric ceftriaxone and azithromycin.  Negative 

blood culture.     








- Plan Summary


Plan Summary: 





Possible discharge in 1-2 days.

## 2019-02-04 RX ADMIN — BUSPIRONE HYDROCHLORIDE SCH MG: 10 TABLET ORAL at 09:57

## 2019-02-04 RX ADMIN — ROPINIROLE HYDROCHLORIDE SCH MG: 2 TABLET, FILM COATED ORAL at 21:07

## 2019-02-04 RX ADMIN — LANSOPRAZOLE SCH MG: 30 TABLET, ORALLY DISINTEGRATING, DELAYED RELEASE ORAL at 05:24

## 2019-02-04 RX ADMIN — IPRATROPIUM BROMIDE AND ALBUTEROL SULFATE SCH ML: 2.5; .5 SOLUTION RESPIRATORY (INHALATION) at 08:04

## 2019-02-04 RX ADMIN — ACETAMINOPHEN PRN MG: 325 TABLET ORAL at 05:23

## 2019-02-04 RX ADMIN — IPRATROPIUM BROMIDE SCH MG: 0.5 SOLUTION RESPIRATORY (INHALATION) at 15:47

## 2019-02-04 RX ADMIN — PREDNISONE SCH MG: 20 TABLET ORAL at 09:57

## 2019-02-04 RX ADMIN — PREGABALIN SCH MG: 100 CAPSULE ORAL at 13:22

## 2019-02-04 RX ADMIN — PREGABALIN SCH MG: 100 CAPSULE ORAL at 21:06

## 2019-02-04 RX ADMIN — IPRATROPIUM BROMIDE AND ALBUTEROL SULFATE SCH ML: 2.5; .5 SOLUTION RESPIRATORY (INHALATION) at 04:02

## 2019-02-04 RX ADMIN — IPRATROPIUM BROMIDE AND ALBUTEROL SULFATE SCH ML: 2.5; .5 SOLUTION RESPIRATORY (INHALATION) at 00:18

## 2019-02-04 RX ADMIN — ROPINIROLE HYDROCHLORIDE SCH MG: 2 TABLET, FILM COATED ORAL at 09:57

## 2019-02-04 RX ADMIN — CLONAZEPAM SCH MG: 1 TABLET ORAL at 21:06

## 2019-02-04 RX ADMIN — TIOTROPIUM BROMIDE SCH CAP: 18 CAPSULE ORAL; RESPIRATORY (INHALATION) at 09:57

## 2019-02-04 RX ADMIN — GUAIFENESIN SCH MG: 600 TABLET, EXTENDED RELEASE ORAL at 21:06

## 2019-02-04 RX ADMIN — FLUTICASONE PROPIONATE AND SALMETEROL SCH INHALER: 50; 250 POWDER RESPIRATORY (INHALATION) at 21:07

## 2019-02-04 RX ADMIN — IPRATROPIUM BROMIDE SCH MG: 0.5 SOLUTION RESPIRATORY (INHALATION) at 11:37

## 2019-02-04 RX ADMIN — LEVALBUTEROL HYDROCHLORIDE SCH MG: 1.25 SOLUTION RESPIRATORY (INHALATION) at 19:27

## 2019-02-04 RX ADMIN — NYSTATIN SCH UNIT: 100000 SUSPENSION ORAL at 21:12

## 2019-02-04 RX ADMIN — NYSTATIN SCH UNIT: 100000 SUSPENSION ORAL at 13:22

## 2019-02-04 RX ADMIN — FLUTICASONE PROPIONATE AND SALMETEROL SCH INHALER: 50; 250 POWDER RESPIRATORY (INHALATION) at 09:57

## 2019-02-04 RX ADMIN — LEVALBUTEROL HYDROCHLORIDE SCH MG: 1.25 SOLUTION RESPIRATORY (INHALATION) at 11:37

## 2019-02-04 RX ADMIN — IPRATROPIUM BROMIDE SCH MG: 0.5 SOLUTION RESPIRATORY (INHALATION) at 23:50

## 2019-02-04 RX ADMIN — GUAIFENESIN SCH MG: 600 TABLET, EXTENDED RELEASE ORAL at 09:56

## 2019-02-04 RX ADMIN — NYSTATIN SCH UNIT: 100000 SUSPENSION ORAL at 17:27

## 2019-02-04 RX ADMIN — ENOXAPARIN SODIUM SCH MG: 40 INJECTION SUBCUTANEOUS at 09:58

## 2019-02-04 RX ADMIN — IPRATROPIUM BROMIDE SCH MG: 0.5 SOLUTION RESPIRATORY (INHALATION) at 19:27

## 2019-02-04 RX ADMIN — PREGABALIN SCH MG: 100 CAPSULE ORAL at 05:23

## 2019-02-04 RX ADMIN — LEVALBUTEROL HYDROCHLORIDE SCH MG: 1.25 SOLUTION RESPIRATORY (INHALATION) at 15:47

## 2019-02-04 RX ADMIN — NYSTATIN SCH UNIT: 100000 SUSPENSION ORAL at 09:57

## 2019-02-04 RX ADMIN — LEVALBUTEROL HYDROCHLORIDE SCH MG: 1.25 SOLUTION RESPIRATORY (INHALATION) at 23:50

## 2019-02-04 RX ADMIN — BUSPIRONE HYDROCHLORIDE SCH MG: 10 TABLET ORAL at 21:06

## 2019-02-04 NOTE — PDOC PROGRESS REPORT
Subjective


Progress Note for:: 02/04/19


Subjective:: 


Patient very anxious stating she is feeling like she is getting sick again, she 

is coughing since she was started on mucolytic's. 


Used BiPAP currently overnight and on 2 L of supplemental oxygen. 


Uses home oxygen 2 L and uses CPAP at home which she makes states that she still

makes her very anxious.





She is also very emotional about her  having stage IV pancreatic cancer 

who is living at home. 





P.o. tolerant, having normal bowel and bladder movements, ambulatory with 

oxygen.  Denies any fever, chills, nausea, vomiting, diarrhea, constipation or 

any urinary symptoms.








Reason For Visit: 


COPD EXACERBATION








Physical Exam


Vital Signs: 


                                        











Temp Pulse Resp BP Pulse Ox


 


 97.6 F   79   18   131/69 H  97 


 


 02/04/19 07:53  02/04/19 07:53  02/04/19 07:53  02/04/19 07:53  02/04/19 07:53








                                 Intake & Output











 02/03/19 02/04/19 02/05/19





 06:59 06:59 06:59


 


Intake Total 1304 1291 


 


Balance 1304 1291 


 


Weight 96.8 kg 95.4 kg 











General appearance: PRESENT: no acute distress, well-developed, well-nourished


Head exam: PRESENT: atraumatic, normocephalic


Respiratory exam: PRESENT: clear to auscultation soha.  ABSENT: rales, rhonchi, 

wheezes


Cardiovascular exam: PRESENT: RRR.  ABSENT: diastolic murmur, rubs, systolic 

murmur


GI/Abdominal exam: PRESENT: normal bowel sounds, soft.  ABSENT: distended, 

guarding, mass, organolmegaly, rebound, tenderness


Neurological exam: PRESENT: alert, awake, oriented to person, oriented to place,

oriented to time, oriented to situation, CN II-XII grossly intact.  ABSENT: m

otor sensory deficit


Psychiatric exam: PRESENT: anxious


Skin exam: PRESENT: dry, intact, warm.  ABSENT: cyanosis, rash





Results


Laboratory Results: 


                                        





                                 02/02/19 04:28 





                                 02/02/19 16:30 





                                        











  01/27/19 02/02/19 02/02/19





  06:05 16:30 16:30


 


Creatine Kinase   52 


 


CK-MB (CK-2)    2.02


 


Troponin I  < 0.012   < 0.012














  02/02/19 02/03/19 02/03/19





  22:15 04:34 10:22


 


Creatine Kinase   


 


CK-MB (CK-2)   


 


Troponin I  < 0.012  < 0.012  < 0.012











Impressions: 


                                        





Chest CT  01/27/19 00:00


IMPRESSION:  1. Emphysema.  Tree-in-bud opacities at the bilateral upper lobes, 

suggestive of acute infection/ inflammation of the smaller airways such as 

bronchiolitis or bronchopneumonia.


2. 4.2 x 4.2 cm ascending thoracic aortic aneurysm.


 








Chest X-Ray  02/02/19 06:00


IMPRESSION:  NO ACUTE RADIOGRAPHIC FINDING IN THE CHEST.


 














Assessment & Plan





- Diagnosis


(1) Acute respiratory failure with hypercapnia


Is this a current diagnosis for this admission?: Yes   


Plan: 


Improving.  


Currently saturating in the 90s on 2 L supplemental oxygen.  Nocturnal BiPAP.  

Tolerating well.





Switch DuoNeb to Xopenex and ipratropium nebs.


decrease prednisone from 40-20 mg daily.  Day 8 of steroids. Consider stopping 

tomorrow.





Continue Spiriva daily, Advair twice daily.





Outpatient pulmonary and PCP follow-up.








(2) COPD exacerbation


Is this a current diagnosis for this admission?: Yes   


Plan: 


Per problem #1.








(3) Morbid obesity with BMI of 40.0-44.9, adult


Is this a current diagnosis for this admission?: Yes   


Plan: 


Diet and start modification.








(4) KRISTEN on CPAP


Is this a current diagnosis for this admission?: Yes   


Plan: 


Uses CPAP at home.  Sees Dr. Franklin as outpatient.








(5) Pneumonia


Qualifiers: 


   Pneumonia type: due to unspecified organism   Laterality: unspecified 

laterality   Lung location: unspecified part of lung   Qualified Code(s): J18.9 

- Pneumonia, unspecified organism   


Is this a current diagnosis for this admission?: Yes   


Plan: 


Cultures negative.  Completed a course of empiric ceftriaxone and azithromycin. 

Continue monitoring vitals.








(6) Anxiety, generalized


Is this a current diagnosis for this admission?: Yes   


Plan: 


Start on BuSpar.  Follow-up with PCP.

## 2019-02-05 LAB
ABSOLUTE LYMPHOCYTES# (MANUAL): 3.8 10^3/UL (ref 0.5–4.7)
ABSOLUTE MONOCYTES # (MANUAL): 1.6 10^3/UL (ref 0.1–1.4)
ABSOLUTE NEUTROPHILS# (MANUAL): 6.8 10^3/UL (ref 1.7–8.2)
ADD MANUAL DIFF: YES
ALBUMIN SERPL-MCNC: 3.5 G/DL (ref 3.5–5)
ALP SERPL-CCNC: 55 U/L (ref 38–126)
ALT SERPL-CCNC: 40 U/L (ref 9–52)
ANION GAP SERPL CALC-SCNC: 10 MMOL/L (ref 5–19)
AST SERPL-CCNC: 16 U/L (ref 14–36)
BASOPHILS NFR BLD MANUAL: 0 % (ref 0–2)
BILIRUB DIRECT SERPL-MCNC: 0.3 MG/DL (ref 0–0.4)
BILIRUB SERPL-MCNC: 0.4 MG/DL (ref 0.2–1.3)
BUN SERPL-MCNC: 20 MG/DL (ref 7–20)
CALCIUM: 8.6 MG/DL (ref 8.4–10.2)
CHLORIDE SERPL-SCNC: 100 MMOL/L (ref 98–107)
CO2 SERPL-SCNC: 31 MMOL/L (ref 22–30)
EOSINOPHIL NFR BLD MANUAL: 0 % (ref 0–6)
ERYTHROCYTE [DISTWIDTH] IN BLOOD BY AUTOMATED COUNT: 14.4 % (ref 11.5–14)
GLUCOSE SERPL-MCNC: 179 MG/DL (ref 75–110)
HCT VFR BLD CALC: 42.4 % (ref 36–47)
HGB BLD-MCNC: 14.3 G/DL (ref 12–15.5)
MCH RBC QN AUTO: 31.7 PG (ref 27–33.4)
MCHC RBC AUTO-ENTMCNC: 33.8 G/DL (ref 32–36)
MCV RBC AUTO: 94 FL (ref 80–97)
MONOCYTES % (MANUAL): 13 % (ref 3–13)
PLATELET # BLD: 147 10^3/UL (ref 150–450)
PLATELET COMMENT: ADEQUATE
POTASSIUM SERPL-SCNC: 4.1 MMOL/L (ref 3.6–5)
PROT SERPL-MCNC: 5.8 G/DL (ref 6.3–8.2)
RBC # BLD AUTO: 4.52 10^6/UL (ref 3.72–5.28)
RBC MORPH BLD: (no result)
SEGMENTED NEUTROPHILS % (MAN): 56 % (ref 42–78)
SODIUM SERPL-SCNC: 140.5 MMOL/L (ref 137–145)
TOTAL CELLS COUNTED BLD: 100
VARIANT LYMPHS NFR BLD MANUAL: 31 % (ref 13–45)
WBC # BLD AUTO: 12.2 10^3/UL (ref 4–10.5)

## 2019-02-05 RX ADMIN — NYSTATIN SCH UNIT: 100000 SUSPENSION ORAL at 09:28

## 2019-02-05 RX ADMIN — TIOTROPIUM BROMIDE SCH CAP: 18 CAPSULE ORAL; RESPIRATORY (INHALATION) at 09:23

## 2019-02-05 RX ADMIN — LEVALBUTEROL HYDROCHLORIDE SCH MG: 1.25 SOLUTION RESPIRATORY (INHALATION) at 23:51

## 2019-02-05 RX ADMIN — PREGABALIN SCH MG: 100 CAPSULE ORAL at 13:13

## 2019-02-05 RX ADMIN — IPRATROPIUM BROMIDE SCH MG: 0.5 SOLUTION RESPIRATORY (INHALATION) at 07:57

## 2019-02-05 RX ADMIN — FLUTICASONE PROPIONATE AND SALMETEROL SCH INHALER: 50; 250 POWDER RESPIRATORY (INHALATION) at 21:06

## 2019-02-05 RX ADMIN — PREDNISONE SCH MG: 20 TABLET ORAL at 09:21

## 2019-02-05 RX ADMIN — LEVALBUTEROL HYDROCHLORIDE SCH MG: 1.25 SOLUTION RESPIRATORY (INHALATION) at 03:52

## 2019-02-05 RX ADMIN — GUAIFENESIN SCH MG: 600 TABLET, EXTENDED RELEASE ORAL at 09:21

## 2019-02-05 RX ADMIN — ROPINIROLE HYDROCHLORIDE SCH MG: 2 TABLET, FILM COATED ORAL at 09:22

## 2019-02-05 RX ADMIN — LEVALBUTEROL HYDROCHLORIDE SCH MG: 1.25 SOLUTION RESPIRATORY (INHALATION) at 11:31

## 2019-02-05 RX ADMIN — LEVALBUTEROL HYDROCHLORIDE SCH MG: 1.25 SOLUTION RESPIRATORY (INHALATION) at 07:57

## 2019-02-05 RX ADMIN — NYSTATIN SCH UNIT: 100000 SUSPENSION ORAL at 21:07

## 2019-02-05 RX ADMIN — LEVALBUTEROL HYDROCHLORIDE SCH MG: 1.25 SOLUTION RESPIRATORY (INHALATION) at 16:01

## 2019-02-05 RX ADMIN — IPRATROPIUM BROMIDE SCH MG: 0.5 SOLUTION RESPIRATORY (INHALATION) at 16:01

## 2019-02-05 RX ADMIN — PREGABALIN SCH MG: 100 CAPSULE ORAL at 05:11

## 2019-02-05 RX ADMIN — ROPINIROLE HYDROCHLORIDE SCH MG: 2 TABLET, FILM COATED ORAL at 21:07

## 2019-02-05 RX ADMIN — LEVALBUTEROL HYDROCHLORIDE SCH MG: 1.25 SOLUTION RESPIRATORY (INHALATION) at 19:51

## 2019-02-05 RX ADMIN — IPRATROPIUM BROMIDE SCH MG: 0.5 SOLUTION RESPIRATORY (INHALATION) at 19:51

## 2019-02-05 RX ADMIN — NYSTATIN SCH UNIT: 100000 SUSPENSION ORAL at 18:01

## 2019-02-05 RX ADMIN — PREGABALIN SCH MG: 100 CAPSULE ORAL at 21:07

## 2019-02-05 RX ADMIN — IPRATROPIUM BROMIDE SCH MG: 0.5 SOLUTION RESPIRATORY (INHALATION) at 11:31

## 2019-02-05 RX ADMIN — BUSPIRONE HYDROCHLORIDE SCH MG: 10 TABLET ORAL at 21:07

## 2019-02-05 RX ADMIN — ENOXAPARIN SODIUM SCH MG: 40 INJECTION SUBCUTANEOUS at 09:20

## 2019-02-05 RX ADMIN — CLONAZEPAM SCH MG: 1 TABLET ORAL at 21:07

## 2019-02-05 RX ADMIN — TEMAZEPAM PRN MG: 7.5 CAPSULE ORAL at 03:43

## 2019-02-05 RX ADMIN — GUAIFENESIN SCH MG: 600 TABLET, EXTENDED RELEASE ORAL at 21:07

## 2019-02-05 RX ADMIN — BUSPIRONE HYDROCHLORIDE SCH MG: 10 TABLET ORAL at 09:21

## 2019-02-05 RX ADMIN — IPRATROPIUM BROMIDE SCH MG: 0.5 SOLUTION RESPIRATORY (INHALATION) at 23:51

## 2019-02-05 RX ADMIN — FLUTICASONE PROPIONATE AND SALMETEROL SCH INHALER: 50; 250 POWDER RESPIRATORY (INHALATION) at 09:27

## 2019-02-05 RX ADMIN — IPRATROPIUM BROMIDE SCH MG: 0.5 SOLUTION RESPIRATORY (INHALATION) at 03:52

## 2019-02-05 RX ADMIN — LANSOPRAZOLE SCH MG: 30 TABLET, ORALLY DISINTEGRATING, DELAYED RELEASE ORAL at 05:11

## 2019-02-05 RX ADMIN — NYSTATIN SCH UNIT: 100000 SUSPENSION ORAL at 13:13

## 2019-02-05 RX ADMIN — FLUOXETINE SCH MG: 20 CAPSULE ORAL at 14:49

## 2019-02-05 NOTE — PDOC PROGRESS REPORT
Subjective


Progress Note for:: 02/05/19


Subjective:: 


No acute events overnight.  Patient is less anxious today.  Stating she is 

feeling much better and back to baseline.





Cough has improved, Used BiPAP currently overnight and on 2 L of supplemental 

oxygen. 





Uses home oxygen 2 L and uses CPAP at home which she makes states that she still

makes her very anxious.





She is also very emotional about her  having stage IV pancreatic cancer 

who is living at home.  She she mentions she feels depressed and suffers from 

chronic insomnia also has a history of depression when she was younger and she 

was treated with SSRIs.  Not getting any treatment for her underlying 

depression.  Denies any suicidal or homicidal ideation.





P.o. tolerant, having normal bowel and bladder movements, ambulatory with 

oxygen.  Denies any fever, chills, nausea, vomiting, diarrhea, constipation or 

any urinary symptoms.








Reason For Visit: 


COPD EXACERBATION








Physical Exam


Vital Signs: 


                                        











Temp Pulse Resp BP Pulse Ox


 


 97.4 F   72   16   111/64   95 


 


 02/05/19 11:28  02/05/19 11:31  02/05/19 11:31  02/05/19 11:28  02/05/19 11:31








                                 Intake & Output











 02/04/19 02/05/19 02/06/19





 06:59 06:59 06:59


 


Intake Total 1291  


 


Balance 1291  


 


Weight 95.4 kg 96.2 kg 











General appearance: PRESENT: no acute distress, well-developed, well-nourished


Head exam: PRESENT: atraumatic, normocephalic


Neck exam: ABSENT: carotid bruit, JVD, lymphadenopathy, thyromegaly


Respiratory exam: PRESENT: clear to auscultation soha.  ABSENT: rales, rhonchi, 

wheezes


Cardiovascular exam: PRESENT: RRR.  ABSENT: diastolic murmur, rubs, systolic 

murmur


GI/Abdominal exam: PRESENT: normal bowel sounds, soft.  ABSENT: distended, 

guarding, mass, organolmegaly, rebound, tenderness


Extremities exam: PRESENT: full ROM.  ABSENT: calf tenderness, clubbing, pedal 

edema


Neurological exam: PRESENT: alert, awake, oriented to person, oriented to place,

oriented to time, oriented to situation, CN II-XII grossly intact.  ABSENT: 

motor sensory deficit


Psychiatric exam: PRESENT: anxious





Results


Laboratory Results: 


                                        





                                 02/05/19 04:31 





                                 02/05/19 04:31 





                                        











  02/05/19 02/05/19





  04:31 04:31


 


WBC  12.2 H 


 


RBC  4.52 


 


Hgb  14.3 


 


Hct  42.4 


 


MCV  94 


 


MCH  31.7 


 


MCHC  33.8 


 


RDW  14.4 H 


 


Plt Count  147 L 


 


Seg Neutrophils %  Not Reportable 


 


Lymphocytes %  Not Reportable 


 


Monocytes %  Not Reportable 


 


Eosinophils %  Not Reportable 


 


Basophils %  Not Reportable 


 


Absolute Neutrophils  Not Reportable 


 


Absolute Lymphocytes  Not Reportable 


 


Absolute Monocytes  Not Reportable 


 


Absolute Eosinophils  Not Reportable 


 


Absolute Basophils  Not Reportable 


 


Sodium   140.5


 


Potassium   4.1


 


Chloride   100


 


Carbon Dioxide   31 H


 


Anion Gap   10


 


BUN   20


 


Creatinine   0.53


 


Est GFR ( Amer)   > 60


 


Est GFR (Non-Af Amer)   > 60


 


Glucose   179 H


 


Calcium   8.6


 


Magnesium   2.4 H


 


Total Bilirubin   0.4


 


AST   16


 


ALT   40


 


Alkaline Phosphatase   55


 


Total Protein   5.8 L


 


Albumin   3.5








                                        











  01/27/19 02/02/19 02/02/19





  06:05 16:30 16:30


 


Creatine Kinase   52 


 


CK-MB (CK-2)    2.02


 


Troponin I  < 0.012   < 0.012














  02/02/19 02/03/19 02/03/19





  22:15 04:34 10:22


 


Creatine Kinase   


 


CK-MB (CK-2)   


 


Troponin I  < 0.012  < 0.012  < 0.012











Impressions: 


                                        





Chest CT  01/27/19 00:00


IMPRESSION:  1. Emphysema.  Tree-in-bud opacities at the bilateral upper lobes, 

suggestive of acute infection/ inflammation of the smaller airways such as 

bronchiolitis or bronchopneumonia.


2. 4.2 x 4.2 cm ascending thoracic aortic aneurysm.


 








Chest X-Ray  02/02/19 06:00


IMPRESSION:  NO ACUTE RADIOGRAPHIC FINDING IN THE CHEST.


 














Assessment & Plan





- Diagnosis


(1) Acute respiratory failure with hypercapnia


Is this a current diagnosis for this admission?: Yes   


Plan: 


Improving.  Plan to discharge tomorrow.





Currently saturating in the 90s on 2 L supplemental oxygen.  Nocturnal BiPAP.  

Tolerating well.





Switch DuoNeb to Xopenex and ipratropium nebs.





DC steroids today.  Received 8 days of total steroids.  





Continue Spiriva daily, Advair twice daily.





Outpatient pulmonary and PCP follow-up.





Sees Dr. Franklin as outpatient.  








(2) COPD exacerbation


Is this a current diagnosis for this admission?: Yes   


Plan: 


Per problem #1.








(3) Morbid obesity with BMI of 40.0-44.9, adult


Is this a current diagnosis for this admission?: Yes   


Plan: 


Diet and start modification.








(4) KRISETN on CPAP


Is this a current diagnosis for this admission?: Yes   


Plan: 


Uses CPAP at home.  Sees Dr. Franklin as outpatient.








(5) Pneumonia


Qualifiers: 


   Pneumonia type: due to unspecified organism   Laterality: unspecified 

laterality   Lung location: unspecified part of lung   Qualified Code(s): J18.9 

- Pneumonia, unspecified organism   


Is this a current diagnosis for this admission?: Yes   


Plan: 


Cultures negative.  Completed a course of empiric ceftriaxone and azithromycin. 

Continue monitoring vitals.








(6) Anxiety, generalized


Is this a current diagnosis for this admission?: Yes   


Plan: 


Much improved.  Start on BuSpar.  Follow-up with PCP.








(7) Chronic depression


Is this a current diagnosis for this admission?: Yes   


Plan: 


Start on fluoxetine.  PCP follow-up.

## 2019-02-06 VITALS — DIASTOLIC BLOOD PRESSURE: 76 MMHG | SYSTOLIC BLOOD PRESSURE: 158 MMHG

## 2019-02-06 LAB
ALBUMIN SERPL-MCNC: 3.5 G/DL (ref 3.5–5)
ALP SERPL-CCNC: 54 U/L (ref 38–126)
ALT SERPL-CCNC: 36 U/L (ref 9–52)
ANION GAP SERPL CALC-SCNC: 12 MMOL/L (ref 5–19)
AST SERPL-CCNC: 16 U/L (ref 14–36)
BILIRUB DIRECT SERPL-MCNC: 0.2 MG/DL (ref 0–0.4)
BILIRUB SERPL-MCNC: 0.4 MG/DL (ref 0.2–1.3)
BUN SERPL-MCNC: 23 MG/DL (ref 7–20)
CALCIUM: 8.5 MG/DL (ref 8.4–10.2)
CHLORIDE SERPL-SCNC: 100 MMOL/L (ref 98–107)
CO2 SERPL-SCNC: 27 MMOL/L (ref 22–30)
GLUCOSE SERPL-MCNC: 190 MG/DL (ref 75–110)
POTASSIUM SERPL-SCNC: 4 MMOL/L (ref 3.6–5)
PROT SERPL-MCNC: 5.5 G/DL (ref 6.3–8.2)
SODIUM SERPL-SCNC: 139.4 MMOL/L (ref 137–145)

## 2019-02-06 RX ADMIN — FLUOXETINE SCH MG: 20 CAPSULE ORAL at 09:49

## 2019-02-06 RX ADMIN — TIOTROPIUM BROMIDE SCH CAP: 18 CAPSULE ORAL; RESPIRATORY (INHALATION) at 09:49

## 2019-02-06 RX ADMIN — ROPINIROLE HYDROCHLORIDE SCH MG: 2 TABLET, FILM COATED ORAL at 09:49

## 2019-02-06 RX ADMIN — TEMAZEPAM PRN MG: 7.5 CAPSULE ORAL at 04:10

## 2019-02-06 RX ADMIN — LANSOPRAZOLE SCH MG: 30 TABLET, ORALLY DISINTEGRATING, DELAYED RELEASE ORAL at 05:20

## 2019-02-06 RX ADMIN — LEVALBUTEROL HYDROCHLORIDE SCH MG: 1.25 SOLUTION RESPIRATORY (INHALATION) at 03:35

## 2019-02-06 RX ADMIN — LEVALBUTEROL HYDROCHLORIDE SCH MG: 1.25 SOLUTION RESPIRATORY (INHALATION) at 07:57

## 2019-02-06 RX ADMIN — IPRATROPIUM BROMIDE SCH MG: 0.5 SOLUTION RESPIRATORY (INHALATION) at 07:57

## 2019-02-06 RX ADMIN — FLUTICASONE PROPIONATE AND SALMETEROL SCH INHALER: 50; 250 POWDER RESPIRATORY (INHALATION) at 09:49

## 2019-02-06 RX ADMIN — PREGABALIN SCH MG: 100 CAPSULE ORAL at 05:20

## 2019-02-06 RX ADMIN — NYSTATIN SCH UNIT: 100000 SUSPENSION ORAL at 09:50

## 2019-02-06 RX ADMIN — ENOXAPARIN SODIUM SCH: 40 INJECTION SUBCUTANEOUS at 09:49

## 2019-02-06 RX ADMIN — BUSPIRONE HYDROCHLORIDE SCH MG: 10 TABLET ORAL at 09:48

## 2019-02-06 RX ADMIN — IPRATROPIUM BROMIDE SCH MG: 0.5 SOLUTION RESPIRATORY (INHALATION) at 03:35

## 2019-02-06 RX ADMIN — GUAIFENESIN SCH MG: 600 TABLET, EXTENDED RELEASE ORAL at 09:49

## 2019-02-06 RX ADMIN — ACETAMINOPHEN PRN MG: 325 TABLET ORAL at 04:10

## 2019-02-06 NOTE — PDOC DISCHARGE SUMMARY
General





- Admit/Disc Date/PCP


Admission Date/Primary Care Provider: 


  01/27/19 10:12





  





Discharge Date: 02/06/19





- Discharge Diagnosis


(1) Acute respiratory failure with hypercapnia


Is this a current diagnosis for this admission?: Yes   





(2) COPD exacerbation


Is this a current diagnosis for this admission?: Yes   





(3) Morbid obesity with BMI of 40.0-44.9, adult


Is this a current diagnosis for this admission?: Yes   





(4) KRISTEN on CPAP


Is this a current diagnosis for this admission?: Yes   





(5) Pneumonia


Is this a current diagnosis for this admission?: Yes   





(6) Anxiety, generalized


Is this a current diagnosis for this admission?: Yes   





(7) Chronic depression


Is this a current diagnosis for this admission?: Yes   





- Additional Information


Discharge Diet: As Tolerated


Discharge Activity: Activity As Tolerated


Prescriptions: 


Albuterol Sulfate [Proair HFA Inhalation Aerosol 8.5 gm MDI] 2 puff IH Q2HP PRN 

30 Days #5 hfa.aer.ad


 PRN Reason: 


Buspirone HCl [Buspar 10 mg Tablet] 10 mg PO Q12 30 Days #60 tablet


Fluoxetine HCl [Prozac 20 mg Capsule] 20 mg PO DAILY 30 Days #30 capsule


Fluticasone/Salmeterol [Advair 250-50 Diskus 14 Dose/Diskus] 1 inh IH Q12 30 

Days #3 inhaler


Home Medications: 








Albuterol Sulfate [Proair HFA Inhalation Aerosol 8.5 gm MDI] 2 puff IH Q6HP PRN 

01/27/19 


Ascorbic Acid [Vitamin C 500 mg Tablet] 500 mg PO DAILY 01/27/19 


Calcium Carbonate/Vitamin D3 [Os-Chance 250 mg with Vitamin D 125 Units] 2 tab PO 

DAILY 01/27/19 


Cholecalciferol (Vitamin D3) [Vitamin D3 1000 Unit Tablet] 1,000 unit PO DAILY 

01/27/19 


Clonazepam [Klonopin] 0.5 mg PO QHS 01/27/19 


Flu Vacc Kw6716-22(6Mos Up)/Pf [Fluarix Quad 1230-0527 Syringe] 0.5 ml IM .RE

CEIVED 9/27/18 MDD 9/27/18 01/27/19 


Fluticasone/Salmeterol [Advair 250-50 Diskus 14 Dose/Diskus] 1 inh IH Q12 

01/27/19 


Folic Acid [Folvite 1 mg Tablet] 1 mg PO DAILY 01/27/19 


Loratadine [Claritin 10 mg Tablet] 10 mg PO DAILY 01/27/19 


Multivitamin [Tab-A-Cristobal (Multiple Vitamin) Tablet] 1 tab PO DAILY 01/27/19 


Pregabalin [Lyrica 100 mg Capsule] 200 mg PO Q8 01/27/19 


Ropinirole HCl [Requip 2 mg Tablet] 2 mg PO Q12 01/27/19 


Tiotropium Bromide [Spiriva Respimat] 2 puff IH DAILY 01/27/19 


Vitamin B Complex [B Complex] 1 each PO DAILY 01/27/19 


Albuterol Sulfate [Proair HFA Inhalation Aerosol 8.5 gm MDI] 2 puff IH Q2HP PRN 

30 Days #5 hfa.aer.ad 02/06/19 


Buspirone HCl [Buspar 10 mg Tablet] 10 mg PO Q12 30 Days #60 tablet 02/06/19 


Fluoxetine HCl [Prozac 20 mg Capsule] 20 mg PO DAILY 30 Days #30 capsule 

02/06/19 


Fluticasone/Salmeterol [Advair 250-50 Diskus 14 Dose/Diskus] 1 inh IH Q12 30 

Days #3 inhaler 02/06/19 











History of Present Illness


History of Present Illness: 


DANIELA BLANKENSHIP is a 53 year old female with h/o COPD who is active smoker. she 

presents with acute onset SOB started 3 days ago associated with fever (max 

102), cough and wheezing. CXR in ER + pneumonia. she was hypercapnic and was 

started on Bipap.





Hospital Course


Hospital Course: 


(1) Acute respiratory failure with hypercapnia


Improved.  





Currently saturating in the 96% on 2 L supplemental oxygen.  Nocturnal BiPAP.  

Continue home supplemental O2.


Started on DuoNeb to Xopenex and ipratropium nebs.


Received 8 days of total steroids.  


Was advised to continue Spiriva daily, Advair twice daily.


Outpatient pulmonary and PCP follow-up 


Sees Dr. Franklin as outpatient.  





(2) COPD exacerbation


Per problem #1.





(3) Morbid obesity with BMI of 40.0-44.9, adult


Diet and start modification.





(4) KRISTEN on CPAP


Uses CPAP at home.  Sees Dr. Franklin as outpatient.





(5) Pneumonia


Cultures negative.  Completed a course of empiric ceftriaxone and azithromycin. 







(6) Anxiety, generalized


Much improved.  Start on BuSpar.  Follow-up with PCP.





(7) Chronic depression


Start on fluoxetine.  PCP follow-up. 





Physical Exam


Vital Signs: 


                                        











Temp Pulse Resp BP Pulse Ox


 


 98.1 F   100   20   158/76 H  96 


 


 02/06/19 10:07  02/06/19 10:07  02/06/19 10:07  02/06/19 10:07  02/06/19 10:07








                                 Intake & Output











 02/05/19 02/06/19 02/07/19





 06:59 06:59 06:59


 


Intake Total  300 


 


Output Total  1950 


 


Balance  -1650 


 


Weight 96.2 kg 96.9 kg 











General appearance: PRESENT: no acute distress, well-developed, well-nourished


Head exam: PRESENT: atraumatic, normocephalic


Respiratory exam: PRESENT: clear to auscultation soha.  ABSENT: rales, rhonchi, 

wheezes


Cardiovascular exam: PRESENT: RRR.  ABSENT: diastolic murmur, rubs, systolic 

murmur


GI/Abdominal exam: PRESENT: normal bowel sounds, soft.  ABSENT: distended, 

guarding, mass, organolmegaly, rebound, tenderness


Neurological exam: PRESENT: alert, awake, oriented to person, oriented to place,

oriented to time, oriented to situation, CN II-XII grossly intact.  ABSENT: 

motor sensory deficit





Results


Laboratory Results: 


                                        





                                 02/05/19 04:31 





                                 02/06/19 05:05 





                                        











  02/06/19





  05:05


 


Sodium  139.4


 


Potassium  4.0


 


Chloride  100


 


Carbon Dioxide  27


 


Anion Gap  12


 


BUN  23 H


 


Creatinine  0.62


 


Est GFR ( Amer)  > 60


 


Est GFR (Non-Af Amer)  > 60


 


Glucose  190 H


 


Calcium  8.5


 


Total Bilirubin  0.4


 


AST  16


 


ALT  36


 


Alkaline Phosphatase  54


 


Total Protein  5.5 L


 


Albumin  3.5








                                        











  01/27/19 02/02/19 02/02/19





  06:05 16:30 16:30


 


Creatine Kinase   52 


 


CK-MB (CK-2)    2.02


 


Troponin I  < 0.012   < 0.012














  02/02/19 02/03/19 02/03/19





  22:15 04:34 10:22


 


Creatine Kinase   


 


CK-MB (CK-2)   


 


Troponin I  < 0.012  < 0.012  < 0.012











Impressions: 


                                        





Chest CT  01/27/19 00:00


IMPRESSION:  1. Emphysema.  Tree-in-bud opacities at the bilateral upper lobes, 

suggestive of acute infection/ inflammation of the smaller airways such as 

bronchiolitis or bronchopneumonia.


2. 4.2 x 4.2 cm ascending thoracic aortic aneurysm.


 








Chest X-Ray  02/02/19 06:00


IMPRESSION:  NO ACUTE RADIOGRAPHIC FINDING IN THE CHEST.


 














Qualifiers





- *


PATIENT BEING DISCHARGED WITH ANY OF THE FOLLOWING DIAGNOSIS: No

## 2021-06-13 NOTE — PROGRESS NOTE E
Progress Note



NAME: DANIELA STEEL

MRN: [id number]

: 1965             AGE: 53Y

DATE:  2019           ROOM: 309



SUBJECTIVE:

The patient is a 53-year-old  female who came with severe COPD

exacerbation, increased cough with purulent sputum production.  Started on

Rocephin and azithromycin and still was noted to be wheezing this morning

per hospitalist's note.  Stated she had some blood thick phlegm.  The

patient's nurse stated that she checked the patient's sputum collection

but there was no blood in it.  There is no fever or chills.  No nausea,

vomiting, diarrhea.



OBJECTIVE:

GENERAL:  The patient is awake, alert, coherent, oriented x3.



VITAL SIGNS:  Temperature of 97.5, heart rate 51, blood pressure is

152/76, saturation is 96 on 3 liters nasal cannula.



EYES:  No jaundice or pallor.



EARS, NOSE, AND THROAT:  No ear drainage.  No nasal discharge.



CHEST AND LUNGS:  No wheezing, no rhonchi, no coarse crackles.



CARDIOVASCULAR:  S1, S2 distinct.  Normal rate and regular rhythm.



ABDOMEN:  Flabby, positive bowel sounds, soft, nondistended, nontender.



EXTREMITIES:  No joint swelling, no cellulitis.



LABORATORY DATA:

CBC done today showed a white count of 8.4, hemoglobin is 13.9, hematocrit

is 40.6, and platelet count is 145.  ABG done today showed pH of 7.39,

pCO2 of 59.6, pO2 is 96, and bicarb is 35.1 and ABG oxygen saturation is

97.1.  Chemistry done today showed sodium is 138, potassium is 4.9,

chloride 97, CO2 is 34, BUN is 31, creatinine 0.57, glucose 287, calcium

is 8.7.



ASSESSMENT:

1.  COPD/BRONCHIAL ASTHMA IN ACUTE SEVERE EXACERBATION.  Currently stable.

Appeared not to be in bronchospasm.

2.  SEVERE ANXIETY REACTION.

3.  BRONCHITIS.  PNEUMONIA COULD NOT BE COMPLETELY EXCLUDED.



PLAN/RECOMMENDATION:

1.  The patient will be able to go home on Levaquin 500 mg tablet p.o.

daily for the next 7 to 10 days.

2.  Will continue prednisone taper every 2 to 3 days to wean off.

3.  Recommend pulmonary clinic follow up in 2 weeks or 3 weeks following

hospital discharge.

4.  Continue the patient's Advair 250 DiskHaler one puff daily and Spiriva

one capsule daily.



We will sign off today.  If you have any questions please feel free to

call me.





DICTATING PHYSICIAN:  DOUGLAS FOX MD,RADHA,MPH





[id number]                  DT: [id number]    [id number]

PHY#: [id number]            DD: [id number]    [id number]

ID:   [id number]           JOB#: [id number]       ACCT: [id number]



cc:

>







MTDD
13-Jun-2021 14:25